# Patient Record
Sex: FEMALE | Race: WHITE | NOT HISPANIC OR LATINO | Employment: FULL TIME | URBAN - METROPOLITAN AREA
[De-identification: names, ages, dates, MRNs, and addresses within clinical notes are randomized per-mention and may not be internally consistent; named-entity substitution may affect disease eponyms.]

---

## 2017-06-09 ENCOUNTER — HOSPITAL ENCOUNTER (OUTPATIENT)
Dept: MAMMOGRAPHY | Facility: CLINIC | Age: 52
Discharge: HOME | End: 2017-06-09
Attending: FAMILY MEDICINE | Admitting: FAMILY MEDICINE
Payer: COMMERCIAL

## 2017-06-09 DIAGNOSIS — Z12.31 VISIT FOR SCREENING MAMMOGRAM: ICD-10-CM

## 2017-06-09 PROCEDURE — G0202 SCR MAMMO BI INCL CAD: HCPCS

## 2017-06-19 ENCOUNTER — OFFICE VISIT (OUTPATIENT)
Dept: FAMILY MEDICINE | Facility: CLINIC | Age: 52
End: 2017-06-19
Payer: COMMERCIAL

## 2017-06-19 VITALS
HEIGHT: 65 IN | HEART RATE: 66 BPM | OXYGEN SATURATION: 100 % | RESPIRATION RATE: 20 BRPM | WEIGHT: 168 LBS | BODY MASS INDEX: 27.99 KG/M2 | DIASTOLIC BLOOD PRESSURE: 70 MMHG | SYSTOLIC BLOOD PRESSURE: 122 MMHG | TEMPERATURE: 98.5 F

## 2017-06-19 DIAGNOSIS — R60.0 BILATERAL LEG EDEMA: ICD-10-CM

## 2017-06-19 DIAGNOSIS — Z00.01 ENCOUNTER FOR ROUTINE ADULT HEALTH EXAMINATION WITH ABNORMAL FINDINGS: Primary | ICD-10-CM

## 2017-06-19 DIAGNOSIS — K64.4 PROLAPSED EXTERNAL HEMORRHOIDS: ICD-10-CM

## 2017-06-19 PROCEDURE — 99396 PREV VISIT EST AGE 40-64: CPT | Performed by: FAMILY MEDICINE

## 2017-06-19 PROCEDURE — 99213 OFFICE O/P EST LOW 20 MIN: CPT | Mod: 25 | Performed by: FAMILY MEDICINE

## 2017-06-19 RX ORDER — LIDOCAINE 5 G/100G
CREAM RECTAL; TOPICAL
Qty: 15 G | Refills: 1 | Status: SHIPPED | OUTPATIENT
Start: 2017-06-19 | End: 2018-06-22

## 2017-06-19 ASSESSMENT — PAIN SCALES - GENERAL: PAINLEVEL: NO PAIN (0)

## 2017-06-19 NOTE — PROGRESS NOTES
SUBJECTIVE:     CC: Merari Cowart is an 51 year old woman who presents for preventive health visit.     Physical   Annual:     Getting at least 3 servings of Calcium per day::  NO    Bi-annual eye exam::  Yes    Dental care twice a year::  Yes    Sleep apnea or symptoms of sleep apnea::  None    Diet::  Regular (no restrictions)    Frequency of exercise::  4-5 days/week    Duration of exercise::  30-45 minutes    Taking medications regularly::  Yes    Medication side effects::  Not applicable    Additional concerns today::  YES    Merari is a 52 yo premenopausal female presenting for routine health maintenance exam with the following concerns-  - Hemorrhoids: patient has had hemorrhoids since pregnancy and delivery 24 years ago. Do not bother her all the time but intermittently flare and become painful. Has not noticed blood in her stool. Does not have regular bowel movements and sometimes strains to have BM. Drinks water throughout the day. Does not use a fiber supplement.   -Bilateral LE Edema: on physical exam noted patient has bilateral LE swelling. Patient reports she has had this for years when the weather is warmer. It is non-painful. Has tried wearing compression stockings but found them uncomfortable.   She otherwise is doing well. Last physical exam was 1 years ago and it was normal. Last pap with HPV co-testing in 5/2016 was normal and negative. Still having regular menstrual cycles. No history of STD and denies risks for it. No URI symptoms include running nose, nasal congestion, coughing, fever or chill. Denies problems with urination.  No abdominal pain. Exercises regularly with walks- no chest pain or SOB with walking. No leg cramping. No drug, alcohol, or tobacco use. Feels safe at home and at work. Lives with her  and their son. No other concerns today.    10 point ROS of systems including Constitutional, Eyes, Respiratory, Cardiovascular, Gastroenterology, Genitourinary,  Integumentary, Muscularskeletal, Psychiatric were all negative except for pertinent positives noted in my HPI.      Today's PHQ-2 Score:   PHQ-2 ( 1999 Pfizer) 6/14/2017   Q1: Little interest or pleasure in doing things 0   Q2: Feeling down, depressed or hopeless 0   PHQ-2 Score 0   Q1: Little interest or pleasure in doing things Not at all   Q2: Feeling down, depressed or hopeless Not at all   PHQ-2 Score 0       Abuse: Current or Past(Physical, Sexual or Emotional)- No  Do you feel safe in your environment - Yes    Social History   Substance Use Topics     Smoking status: Never Smoker     Smokeless tobacco: Never Used     Alcohol use 0.0 oz/week     0 Standard drinks or equivalent per week      Comment: 6/month     The patient does not drink >3 drinks per day nor >7 drinks per week.    Recent Labs   Lab Test  05/24/16   1049   CHOL  199   HDL  58   LDL  107*   TRIG  168*   NHDL  141*       Reviewed orders with patient.  Reviewed health maintenance and updated orders accordingly - Yes    Mammo Decision Support:6/2017  Pertinent mammograms are reviewed under the imaging tab.  History of abnormal Pap smear: NO - age 30-65 PAP every 5 years with negative HPV co-testing recommended    Reviewed and updated as needed this visit by clinical staff  Tobacco  Allergies  Meds  Problems  Med Hx  Surg Hx  Fam Hx  Soc Hx          Reviewed and updated as needed this visit by Provider  Tobacco  Allergies  Meds  Problems  Med Hx  Surg Hx  Fam Hx  Soc Hx             ROS:  C: NEGATIVE for fever, chills, change in weight  I: NEGATIVE for worrisome rashes, moles or lesions  E: NEGATIVE for vision changes or irritation  ENT: NEGATIVE for ear, mouth and throat problems  R: NEGATIVE for significant cough or SOB  B: NEGATIVE for masses, tenderness or discharge  CV: NEGATIVE for chest pain, palpitations or peripheral edema  GI: NEGATIVE for nausea, abdominal pain, heartburn, or change in bowel habits  : NEGATIVE for unusual  "urinary or vaginal symptoms. Periods are regular.  M: NEGATIVE for significant arthralgias or myalgia  N: NEGATIVE for weakness, dizziness or paresthesias  P: NEGATIVE for changes in mood or affect    Problem list, Medication list, Allergies, and Medical/Social/Surgical histories reviewed in [facility] and updated as appropriate.  OBJECTIVE:     /70  Pulse 66  Temp 98.5  F (36.9  C) (Temporal)  Resp 20  Ht 5' 5\" (1.651 m)  Wt 168 lb (76.2 kg)  SpO2 100%  BMI 27.96 kg/m2  EXAM:  GENERAL: healthy, alert and no distress  EYES: Eyes grossly normal to inspection, PERRL and conjunctivae and sclerae normal  HENT: ear canals and TM's normal, nose and mouth without ulcers or lesions  NECK: no adenopathy, no asymmetry, masses, or scars and thyroid normal to palpation  RESP: lungs clear to auscultation - no rales, rhonchi or wheezes  CV: regular rate and rhythm, normal S1 S2, no murmur, click or rub  ABDOMEN: soft, nontender, no hepatosplenomegaly, no masses and bowel sounds present  RECTAL: 1cm sized prolapsed hemorrhoids noted at 3 o'clock and 6 o'clock positions, non inflamed, non-thrombotic, with some tenderness to palpation.  No fissures noted.  MS: 1+ bilateral LE edema, non tender to palpation and no palpable abnormalities  SKIN: no suspicious lesions or rashes  NEURO: Normal strength and tone equal bilaterally, mentation intact and speech normal  PSYCH: mentation appears normal, affect normal/bright, makes appropriate eye contact    ASSESSMENT/PLAN:     Merari was seen today for routine health maintenance exam.      ICD-10-CM    1. Encounter for routine adult health examination with abnormal findings Z00.01    2. Prolapsed external hemorrhoids K64.4 hydrocortisone (ANUSOL-HC) 2.5 % cream     lidocaine, Anorectal, 5 % CREA   3. Bilateral leg edema R60.0      1. Encounter for routine adult health examination with abnormal findings- Overall Merari is healthy and doing well. UTD for immunizations.  Topics " appropriate for her age discussed include safety issue and healthy diet as well as substance abuse/STD/depression prevention. Recommended daily exercise for at least 30 minutes.  Emphasized on healthy and regular diet with adequate fluid intake and resting. Recommend considering a daily fiber supplement for more regular bowel movements. She stated that she feels safe at home- no concern for abuse.   - Follow up in 1 year for routine health maintenance exam. No labs ordered today as lipid screen and fasting glucose normal at last years visit and no risk factors or family history identified today.   - Hepatitis C screen not done today- patient has donated blood in past 5 years without issues. May consider testing in the future when routine labs are necessary.  - UTD for her colonoscopy - due in 4 years  - UTD for mammogram- recommend annual or every 2 years.     2. Prolapsed external hemorrhoids- Patient with prolapsed Grade 2-3 hemorrhoid on physical exam. Plan for referral to general surgery for further evaluation and potential intervention. Prescribed topical hydrocortisone and lidocaine to help with symptomatic flares until surgery consult.   -     hydrocortisone (ANUSOL-HC) 2.5 % cream; Place rectally 2 times daily  -     lidocaine, Anorectal, 5 % CREA; Apply to rectum twice daily.    3. Bilateral leg edema- Bilateral LE edema that is non painful and non-pitting. Most likely secondary to venous insufficiency. Continue to monitor and if swelling becomes more severe, or seems to start occurring year round will consider further evaluation at that time.         COUNSELING:  Reviewed preventive health counseling, as reflected in patient instructions  Special attention given to:        Regular exercise       Healthy diet/nutrition       Vision screening       Hearing screening       Immunizations up to date           Colon cancer screening       Consider Hep C screening for patients born between 1945 and 1965          "reports that she has never smoked. She has never used smokeless tobacco.    Estimated body mass index is 27.96 kg/(m^2) as calculated from the following:    Height as of this encounter: 5' 5\" (1.651 m).    Weight as of this encounter: 168 lb (76.2 kg).   Weight management plan: Discussed healthy diet and exercise guidelines and patient will follow up in as needed in clinic to re-evaluate.    Counseling Resources:  ATP IV Guidelines  Pooled Cohorts Equation Calculator  Breast Cancer Risk Calculator  FRAX Risk Assessment  ICSI Preventive Guidelines  Dietary Guidelines for Americans, 2010  USDA's MyPlate  ASA Prophylaxis  Lung CA Screening    Geronimo Greenberg MD  [facility]  Answers for HPI/ROS submitted by the patient on 6/14/2017   PHQ-2 Score: 0        I, Kasey Zambrano, am serving as a scribe; to document services personally performed by Geronimo Greenberg MD - based on data collection and the provider's statements to me.    Provider Disclosure:  I agree with above History, Review of Systems, Physical exam and Plan. I have reviewed the content of the documentation and have edited it as needed. I have personally performed the services documented here and the documentation accurately represents those services and the decisions I have made.    Geronimo Greenberg MD   "

## 2017-06-19 NOTE — MR AVS SNAPSHOT
After Visit Summary   6/19/2017    Merari Cowart    MRN: [id number]           Patient Information     Date Of Birth          1965        Visit Information        Provider Department      6/19/2017 8:00 AM Geronimo Greenberg MD [facility]        Today's Diagnoses     Encounter for routine adult health examination with abnormal findings    -  1    Prolapsed external hemorrhoids          Care Instructions      Preventive Health Recommendations  Female Ages 50 - 64    Yearly exam: See your health care provider every year in order to  o Review health changes.   o Discuss preventive care.    o Review your medicines if your doctor has prescribed any.      Get a Pap test every three years (unless you have an abnormal result and your provider advises testing more often).    If you get Pap tests with HPV test, you only need to test every 5 years, unless you have an abnormal result.     You do not need a Pap test if your uterus was removed (hysterectomy) and you have not had cancer.    You should be tested each year for STDs (sexually transmitted diseases) if you're at risk.     Have a mammogram every 1 to 2 years.    Have a colonoscopy at age 50, or have a yearly FIT test (stool test). These exams screen for colon cancer.      Have a cholesterol test every 5 years, or more often if advised.    Have a diabetes test (fasting glucose) every three years. If you are at risk for diabetes, you should have this test more often.     If you are at risk for osteoporosis (brittle bone disease), think about having a bone density scan (DEXA).    Shots: Get a flu shot each year. Get a tetanus shot every 10 years.    Nutrition:     Eat at least 5 servings of fruits and vegetables each day.    Eat whole-grain bread, whole-wheat pasta and brown rice instead of white grains and rice.    Talk to your provider about Calcium and Vitamin D.     Lifestyle    Exercise at least 150 minutes a week (30 minutes  a day, 5 days a week). This will help you control your weight and prevent disease.    Limit alcohol to one drink per day.    No smoking.     Wear sunscreen to prevent skin cancer.     See your dentist every six months for an exam and cleaning.    See your eye doctor every 1 to 2 years.            Follow-ups after your visit        Your next 10 appointments already scheduled     Jul 10, 2017  2:15 PM CDT   New Visit with Sky Cordova MD   [facility]    [address] [place] [id number] [phone]              Who to contact     If you have questions or need follow up information about today's clinic visit or your schedule please contact [facility] directly at [phone].  Normal or non-critical lab and imaging results will be communicated to you by MyChart, letter or phone within 4 business days after the clinic has received the results. If you do not hear from us within 7 days, please contact the clinic through [employer] or phone. If you have a critical or abnormal lab result, we will notify you by phone as soon as possible.  Submit refill requests through [employer] or call your pharmacy and they will forward the refill request to us. Please allow 3 business days for your refill to be completed.          Additional Information About Your Visit        [employer] Information     [employer] gives you secure access to your electronic health record. If you see a primary care provider, you can also send messages to your care team and make appointments. If you have questions, please call your primary care clinic.  If you do not have a primary care provider, please call [phone] and they will assist you.        Care EveryWhere ID     This is your Care EveryWhere ID. This could be used by other organizations to access your [facility] medical records  [id number]        Your Vitals Were     Pulse Temperature Respirations Height Pulse Oximetry BMI (Body  "Mass Index)    66 98.5  F (36.9  C) (Temporal) 20 5' 5\" (1.651 m) 100% 27.96 kg/m2       Blood Pressure from Last 3 Encounters:   06/19/17 122/70   09/28/16 126/81   09/06/16 116/74    Weight from Last 3 Encounters:   06/19/17 168 lb (76.2 kg)   10/06/16 175 lb 3.2 oz (79.5 kg)   09/28/16 179 lb 9.6 oz (81.5 kg)              Today, you had the following     No orders found for display         Today's Medication Changes          These changes are accurate as of: 6/19/17  9:32 AM.  If you have any questions, ask your nurse or doctor.               Start taking these medicines.        Dose/Directions    hydrocortisone 2.5 % cream   Commonly known as:  ANUSOL-HC   Used for:  Prolapsed external hemorrhoids   Started by:  Geronimo Greenberg MD        Place rectally 2 times daily   Quantity:  30 g   Refills:  1       lidocaine (Anorectal) 5 % Crea   Used for:  Prolapsed external hemorrhoids   Started by:  Geronimo Greenberg MD        Apply to rectum twice daily.   Quantity:  15 g   Refills:  1            Where to get your medicines      These medications were sent to [facility] [address] [place] [address] [address]  [address] [facility] [address]     Phone:  [phone]     hydrocortisone 2.5 % cream    lidocaine (Anorectal) 5 % Crea                Primary Care Provider Office Phone # Fax #    Geronimo Greenberg [phone]       [address] [address]        Thank you!     Thank you for choosing [facility]  for your care. Our goal is always to provide you with excellent care. Hearing back from our patients is one way we can continue to improve our services. Please take a few minutes to complete the written survey that you may receive in the mail after your visit with us. Thank you!             Your Updated Medication List - Protect others around you: Learn how to safely use, store and throw away your medicines at " www.disposemymeds.org.          This list is accurate as of: 6/19/17  9:32 AM.  Always use your most recent med list.                   Brand Name Dispense Instructions for use    CAYENNE PEPPER PO          hydrocortisone 2.5 % cream    ANUSOL-HC    30 g    Place rectally 2 times daily       lidocaine (Anorectal) 5 % Crea     15 g    Apply to rectum twice daily.       Multi-vitamin Tabs tablet      Take 1 tablet by mouth daily       PRO-BIOTIC BLEND PO

## 2017-06-19 NOTE — PROGRESS NOTES
"   SUBJECTIVE:     CC: Merari Cowart is an 51 year old woman who presents for preventive health visit.     Healthy Habits:    Do you get at least three servings of calcium containing foods daily (dairy, green leafy vegetables, etc.)? {YES/NO, DAIRY INTAKE:849378::\"yes\"}    Amount of exercise or daily activities, outside of work: {AMOUNT EXERCISE:638819}    Problems taking medications regularly {Yes /No default:714864::\"No\"}    Medication side effects: {Yes /No default.:989695::\"No\"}    Have you had an eye exam in the past two years? {YESNOBLANK:832812}    Do you see a dentist twice per year? {YESNOBLANK:084475}    Do you have sleep apnea, excessive snoring or daytime drowsiness?{YESNOBLANK:814545}    {Outside tests to abstract? :410672}    {additional problems to add:617052}    Today's PHQ-2 Score:   PHQ-2 ( 1999 Pfizer) 6/14/2017 9/6/2016   Q1: Little interest or pleasure in doing things 0 0   Q2: Feeling down, depressed or hopeless 0 0   PHQ-2 Score 0 0   Q1: Little interest or pleasure in doing things Not at all -   Q2: Feeling down, depressed or hopeless Not at all -   PHQ-2 Score 0 -     {PHQ-2 LOOK IN ASSESSMENTS :228388}  Abuse: Current or Past(Physical, Sexual or Emotional)- {YES/NO/NA:661292}  Do you feel safe in your environment - {YES/NO/NA:474397}    Social History   Substance Use Topics     Smoking status: Never Smoker     Smokeless tobacco: Never Used     Alcohol use 0.0 oz/week     0 Standard drinks or equivalent per week      Comment: 6/month     {ETOH AUDIT:018921}    Recent Labs   Lab Test  05/24/16   1049   CHOL  199   HDL  58   LDL  107*   TRIG  168*   NHDL  141*       Reviewed orders with patient.  Reviewed health maintenance and updated orders accordingly - {Yes/No:269579::\"Yes\"}    Mammo Decision Support:  {Mammo:807323}    Pertinent mammograms are reviewed under the imaging tab.  History of abnormal Pap smear: {PAP HX:453220}    Reviewed and updated as needed this visit by clinical " "staff         Reviewed and updated as needed this visit by Provider        {HISTORY OPTIONS:864901}    ROS:  {FEMALE PREVENTATIVE ROS:263782}    {CHRONICPROBDATA:748635}  OBJECTIVE:     There were no vitals taken for this visit.  EXAM:  {Exam Choices:108448}    ASSESSMENT/PLAN:     {Diag Picklist:653326}    COUNSELING:   {FEMALE COUNSELING MESSAGES:558049::\"Reviewed preventive health counseling, as reflected in patient instructions\"}    {Blood Pressure Screenin}     reports that she has never smoked. She has never used smokeless tobacco.  {Tobacco Cessation needed for ACO -- Delete if patient is a non-smoker:408406}  Estimated body mass index is 29.15 kg/(m^2) as calculated from the following:    Height as of 16: 5' 5\" (1.651 m).    Weight as of 10/6/16: 175 lb 3.2 oz (79.5 kg).   {Weight Management Plan needed for ACO:030044}    Counseling Resources:  ATP IV Guidelines  Pooled Cohorts Equation Calculator  Breast Cancer Risk Calculator  FRAX Risk Assessment  ICSI Preventive Guidelines  Dietary Guidelines for Americans,   USDA's MyPlate  ASA Prophylaxis  Lung CA Screening    Geronimo Greenberg MD  [facility]  "

## 2017-06-19 NOTE — PROGRESS NOTES
"SUBJECTIVE:  Merari is a 51 year old female who comes in today for discussion on hemorrhoids in addition to her preventative visit.  We discussed the additional charge that will apply to this visit and she understands this.      Patient has had hemorrhoids since pregnancy and delivery 24 years ago. Do not bother her all the time but intermittently flare and become painful. Has not noticed blood in her stool. Does not have regular bowel movements and sometimes strains to have BM. Drinks water throughout the day. Does not use a fiber supplement.     ROS:  See preventative note.  Pertinent review of systems assessed and negative except as stated above.     OBJECTIVE:  /70  Pulse 66  Temp 98.5  F (36.9  C) (Temporal)  Resp 20  Ht 5' 5\" (1.651 m)  Wt 168 lb (76.2 kg)  SpO2 100%  BMI 27.96 kg/m2  Body mass index is 27.96 kg/(m^2).  GENERAL APPEARANCE: healthy, alert and no distress  RECTAL: 1cm sized prolapsed hemorrhoids noted at 3 o'clock and 6 o'clock positions, non inflamed, non-thrombotic, with some tenderness to palpation.  No fissures noted.    ASSESSMENT/ORDERS:    ICD-10-CM    1. Prolapsed external hemorrhoids K64.4 hydrocortisone (ANUSOL-HC) 2.5 % cream     lidocaine, Anorectal, 5 % CREA     PLAN:  1.  Patient with prolapsed Grade 2-3 hemorrhoid on physical exam. Plan for referral to general surgery for further evaluation and potential surgical excision. Prescribed topical hydrocortisone and lidocaine to help with symptomatic flares until surgery consult.   -     hydrocortisone (ANUSOL-HC) 2.5 % cream; Place rectally 2 times daily  -     lidocaine, Anorectal, 5 % CREA; Apply to rectum twice daily.     Geronimo Greenberg MD    "

## 2017-07-10 ENCOUNTER — OFFICE VISIT (OUTPATIENT)
Dept: SURGERY | Facility: CLINIC | Age: 52
End: 2017-07-10
Payer: COMMERCIAL

## 2017-07-10 VITALS — WEIGHT: 165.4 LBS | HEART RATE: 80 BPM | OXYGEN SATURATION: 100 % | BODY MASS INDEX: 27.52 KG/M2 | TEMPERATURE: 98.4 F

## 2017-07-10 DIAGNOSIS — K64.4 RESIDUAL HEMORRHOIDAL SKIN TAG: Primary | ICD-10-CM

## 2017-07-10 PROCEDURE — 99243 OFF/OP CNSLTJ NEW/EST LOW 30: CPT | Mod: 25 | Performed by: SPECIALIST

## 2017-07-10 PROCEDURE — 46600 DIAGNOSTIC ANOSCOPY SPX: CPT | Performed by: SPECIALIST

## 2017-07-10 NOTE — PROGRESS NOTES
Consult requested by Dr. Greenberg    Reason for consultation - hemorrhoids      HPI:  Patient is a 51-year-old white female presenting with a 24 year history of hemorrhoids. She states they flareup when she has constipation which happens about once a month. She feels as long as she is sticks to a good diet her constipation is much improved. She has pain with flareups but denies any pain with bowel movements or bleeding. Last colonoscopy was in 2016 and she reports as normal. There is a family history of colon cancer in her father. She now presents for evaluation of her hemorrhoids.    No past medical history on file.    Past Surgical History:   Procedure Laterality Date     APPENDECTOMY  1992     COLONOSCOPY N/A 6/20/2016    Procedure: COLONOSCOPY;  Surgeon: Danny Caballero MD;  Location:  GI     EXCISE MASS BACK N/A 9/28/2016    Procedure: EXCISE MASS BACK;  Surgeon: Sky Cordova MD;  Location:  OR     Current Outpatient Prescriptions   Medication     hydrocortisone (ANUSOL-HC) 2.5 % cream     lidocaine, Anorectal, 5 % CREA     Probiotic Product (PRO-BIOTIC BLEND PO)     Capsicum, Cayenne, (CAYENNE PEPPER PO)     multivitamin, therapeutic with minerals (MULTI-VITAMIN) TABS     No current facility-administered medications for this visit.         Allergies   Allergen Reactions     No Known Drug Allergies        Social History   Substance Use Topics     Smoking status: Never Smoker     Smokeless tobacco: Never Used     Alcohol use 0.0 oz/week     0 Standard drinks or equivalent per week      Comment: 6/month     Family History   Problem Relation Age of Onset     Colon Cancer Father      Breast Cancer No family hx of      Coronary Artery Disease No family hx of      Hyperlipidemia No family hx of      DIABETES No family hx of       ROS: 10 point ROS neg other than the symptoms noted above in the HPI.    PE:  B/P: Data Unavailable, T: 98.4, P: 80, R: Data Unavailable  General: well developed, well nourished  WF who appears her stated age  HEENT: NC/AT, EOMI, (-)icterus, (-)injection  Neck: Supple, No JVD  Chest: CTA  Heart: S1, S2, (-)m/r/g  Abd: Soft, non tender, non distended  Rectal: No masses, residual tag at 12 o'clock lithotomy  Anoscopy: No masses or internal hemorrhoids  Ext; Warm, no edema  Psych: AAOx3  Neuro: No focal deficits    Colonoscopy - report reviewed from 2016    Impression/plan:  This is a 51-year-old lady presenting with residual hemorrhoidal skin tag. The patient like to have it excised. The plan at this time is for an excision under LMA. The procedure, risks, benefits and alternatives were discussed and she agrees to proceed. She'll be scheduled in the near future.    Sky Cordova MD, FACS

## 2017-07-10 NOTE — MR AVS SNAPSHOT
After Visit Summary   7/10/2017    Merari Cowart    MRN: [id number]           Patient Information     Date Of Birth          1965        Visit Information        Provider Department      7/10/2017 2:15 PM Sky Cordova MD [facility]        Today's Diagnoses     Residual hemorrhoidal skin tag    -  1       Follow-ups after your visit        Who to contact     If you have questions or need follow up information about today's clinic visit or your schedule please contact [facility] directly at [phone].  Normal or non-critical lab and imaging results will be communicated to you by Quitt.chhart, letter or phone within 4 business days after the clinic has received the results. If you do not hear from us within 7 days, please contact the clinic through Photeticat or phone. If you have a critical or abnormal lab result, we will notify you by phone as soon as possible.  Submit refill requests through Good Technology or call your pharmacy and they will forward the refill request to us. Please allow 3 business days for your refill to be completed.          Additional Information About Your Visit        MyChart Information     Good Technology gives you secure access to your electronic health record. If you see a primary care provider, you can also send messages to your care team and make appointments. If you have questions, please call your primary care clinic.  If you do not have a primary care provider, please call [phone] and they will assist you.        Care EveryWhere ID     This is your Care EveryWhere ID. This could be used by other organizations to access your [place] medical records  [id number]        Your Vitals Were     Pulse Temperature Pulse Oximetry BMI (Body Mass Index)          80 98.4  F (36.9  C) (Temporal) 100% 27.52 kg/m2         Blood Pressure from Last 3 Encounters:   06/19/17 122/70   09/28/16 126/81   09/06/16 116/74    Weight from Last 3 Encounters:    07/10/17 165 lb 6.4 oz (75 kg)   06/19/17 168 lb (76.2 kg)   10/06/16 175 lb 3.2 oz (79.5 kg)              We Performed the Following     ANOSCOPY W/WO BRUSH/WASH        Primary Care Provider Office Phone # Fax #    Geronimo Greenberg [phone]       [address]   [facility] [place] [address]        Equal Access to Services     LUISA PERSON : Hadii aad ku hadasho Soomaali, waaxda luqadaha, qaybta kaalmada adeegyada, waxay idiin hayaan adeeg kharash la'aan . So [facility] [phone].    ATENCIÓN: Si habla español, tiene a gonzalez disposición servicios gratuitos de asistencia lingüística. [facility] [phone].    We comply with applicable federal civil rights laws and [place] laws. We do not discriminate on the basis of race, color, national origin, age, disability sex, sexual orientation or gender identity.            Thank you!     Thank you for choosing [facility]  for your care. Our goal is always to provide you with excellent care. Hearing back from our patients is one way we can continue to improve our services. Please take a few minutes to complete the written survey that you may receive in the mail after your visit with us. Thank you!             Your Updated Medication List - Protect others around you: Learn how to safely use, store and throw away your medicines at www.disposemymeds.org.          This list is accurate as of: 7/10/17  2:50 PM.  Always use your most recent med list.                   Brand Name Dispense Instructions for use Diagnosis    CAYENNE PEPPER PO           hydrocortisone 2.5 % cream    ANUSOL-HC    30 g    Place rectally 2 times daily    Prolapsed external hemorrhoids       lidocaine (Anorectal) 5 % Crea     15 g    Apply to rectum twice daily.    Prolapsed external hemorrhoids       Multi-vitamin Tabs tablet      Take 1 tablet by mouth daily        PRO-BIOTIC BLEND PO

## 2017-07-10 NOTE — NURSING NOTE
[facility]    Merari Cowart has been given the following teaching information:  Before Your Surgery booklet  Instructions for Showering or Bathing before Surgery  Request for surgery faxed to Meaghan at [facility]

## 2017-07-10 NOTE — NURSING NOTE
"Chief Complaint   Patient presents with     Rectal Problem     rectal tags, hemorrhoids     Consult     referring Dionicio       Initial Pulse 80  Temp 98.4  F (36.9  C) (Temporal)  Wt 165 lb 6.4 oz (75 kg)  SpO2 100%  BMI 27.52 kg/m2 Estimated body mass index is 27.52 kg/(m^2) as calculated from the following:    Height as of 6/19/17: 5' 5\" (1.651 m).    Weight as of this encounter: 165 lb 6.4 oz (75 kg).  Medication Reconciliation: complete    "

## 2017-07-13 ENCOUNTER — TELEPHONE (OUTPATIENT)
Dept: SURGERY | Facility: OTHER | Age: 52
End: 2017-07-13

## 2017-07-13 NOTE — TELEPHONE ENCOUNTER
Surgery Scheduled    Date of Surgery 09/15/17 Time of Surgery 1:00pm  Procedure: Excision Hemorrhoidal Skin Tags  Hospital/Surgical Facility: [place]  Surgeon: Dr Cordova  Type of Anesthesia Anticipated: LMA  Pre-Op: 08/18/17 with Dr Greenberg   Post-Op: 09/25/17 with Dr Cordova  Pre-Certification -to be completed  Consent Signed -to be completed  Hospital Stay -same day procedure    Surgery Packet (and/or) Colonscopy Prep (was given/or mailed) to patient. Patient was also instructed to arrive 1 hour(s) prior to surgery.  Patient understood and agrees to the plan.      Meaghan Alonso  Specialty

## 2017-08-18 ENCOUNTER — OFFICE VISIT (OUTPATIENT)
Dept: FAMILY MEDICINE | Facility: CLINIC | Age: 52
End: 2017-08-18
Payer: COMMERCIAL

## 2017-08-18 VITALS
SYSTOLIC BLOOD PRESSURE: 108 MMHG | HEART RATE: 74 BPM | RESPIRATION RATE: 18 BRPM | HEIGHT: 65 IN | WEIGHT: 163 LBS | OXYGEN SATURATION: 100 % | BODY MASS INDEX: 27.16 KG/M2 | TEMPERATURE: 98.4 F | DIASTOLIC BLOOD PRESSURE: 60 MMHG

## 2017-08-18 DIAGNOSIS — K64.4 RESIDUAL HEMORRHOIDAL SKIN TAGS: ICD-10-CM

## 2017-08-18 DIAGNOSIS — Z01.818 PREOP GENERAL PHYSICAL EXAM: Primary | ICD-10-CM

## 2017-08-18 PROCEDURE — 99214 OFFICE O/P EST MOD 30 MIN: CPT | Performed by: FAMILY MEDICINE

## 2017-08-18 ASSESSMENT — PAIN SCALES - GENERAL: PAINLEVEL: NO PAIN (0)

## 2017-08-18 NOTE — PROGRESS NOTES
[address] [address]  [phone]  Dept: [phone]    PRE-OP EVALUATION:  Today's date: 2017    Merari Cowart (: 1965) presents for pre-operative evaluation assessment as requested by Dr. MILES.  She requires evaluation and anesthesia risk assessment prior to undergoing surgery/procedure for treatment of hemorrhoids .  Proposed procedure: Hemorrhoids    Date of Surgery/ Procedure: 9/15  Time of Surgery/ Procedure:   Hospital/Surgical Facility: [facility]  Fax number for surgical facility:   Primary Physician: Geronimo Greenberg  Type of Anesthesia Anticipated: General    Patient has a Health Care Directive or Living Will:  NO    1. NO - Do you have a history of heart attack, stroke, stent, bypass or surgery on an artery in the head, neck, heart or legs?  2. NO - Do you ever have any pain or discomfort in your chest?  3. NO - Do you have a history of  Heart Failure?  4. NO - Are you troubled by shortness of breath when: walking on the level, up a slight hill or at night?  5. NO - Do you currently have a cold, bronchitis or other respiratory infection?  6. NO - Do you have a cough, shortness of breath or wheezing?  7. NO - Do you sometimes get pains in the calves of your legs when you walk?  8. NO - Do you or anyone in your family have previous history of blood clots?  9. NO - Do you or does anyone in your family have a serious bleeding problem such as prolonged bleeding following surgeries or cuts?  10. NO - Have you ever had problems with anemia or been told to take iron pills?  11. NO - Have you had any abnormal blood loss such as black, tarry or bloody stools, or abnormal vaginal bleeding?  12. NO - Have you ever had a blood transfusion?  13. NO - Have you or any of your relatives ever had problems with anesthesia?  14. NO - Do you have sleep apnea, excessive snoring or daytime drowsiness?  15. NO - Do you have any prosthetic heart  valves?  16. NO - Do you have prosthetic joints?  17. NO - Is there any chance that you may be pregnant?        HPI:                                                      Brief HPI related to upcoming procedure: history of chronic hemorrhoidal disease that has led to anal skin tag.   Has been recommended by Dr. Cordova for excision of this tissue.      See problem list for active medical problems.  Problems all longstanding and stable, except as noted/documented.  See ROS for pertinent symptoms related to these conditions.                                                                                                  .    MEDICAL HISTORY:                                                    Patient Active Problem List    Diagnosis Date Noted     CARDIOVASCULAR SCREENING; LDL GOAL LESS THAN 130 04/14/2011     Priority: Medium      No past medical history on file.  Past Surgical History:   Procedure Laterality Date     APPENDECTOMY  1992     COLONOSCOPY N/A 6/20/2016    Procedure: COLONOSCOPY;  Surgeon: Danny Caballero MD;  Location:  GI     EXCISE MASS BACK N/A 9/28/2016    Procedure: EXCISE MASS BACK;  Surgeon: Sky Cordova MD;  Location:  OR     Current Outpatient Prescriptions   Medication Sig Dispense Refill     hydrocortisone (ANUSOL-HC) 2.5 % cream Place rectally 2 times daily 30 g 1     lidocaine, Anorectal, 5 % CREA Apply to rectum twice daily. 15 g 1     Probiotic Product (PRO-BIOTIC BLEND PO)        Capsicum, Cayenne, (CAYENNE PEPPER PO)        multivitamin, therapeutic with minerals (MULTI-VITAMIN) TABS Take 1 tablet by mouth daily       OTC products: None, except as noted above    Allergies   Allergen Reactions     No Known Drug Allergies       Latex Allergy: NO    Social History   Substance Use Topics     Smoking status: Never Smoker     Smokeless tobacco: Never Used     Alcohol use 0.0 oz/week     0 Standard drinks or equivalent per week      Comment: 6/month     History   Drug Use No  "      REVIEW OF SYSTEMS:                                                    Constitutional, neuro, ENT, endocrine, pulmonary, cardiac, gastrointestinal, genitourinary, musculoskeletal, integument and psychiatric systems are negative, except as otherwise noted.      EXAM:                                                    /60  Pulse 74  Temp 98.4  F (36.9  C) (Temporal)  Resp 18  Ht 5' 5\" (1.651 m)  Wt 163 lb (73.9 kg)  LMP 07/22/2017  SpO2 100%  BMI 27.12 kg/m2    GENERAL APPEARANCE: healthy, alert and no distress     EYES: EOMI, PERRL     HENT: ear canals and TM's normal and nose and mouth without ulcers or lesions     NECK: no adenopathy, no asymmetry, masses, or scars and thyroid normal to palpation     RESP: lungs clear to auscultation - no rales, rhonchi or wheezes     CV: regular rates and rhythm, normal S1 S2, no S3 or S4 and no murmur, click or rub     ABDOMEN:  soft, nontender, no HSM or masses and bowel sounds normal     MS: extremities normal- no gross deformities noted, no evidence of inflammation in joints, FROM in all extremities.     SKIN: no suspicious lesions or rashes     NEURO: Normal strength and tone, sensory exam grossly normal, mentation intact and speech normal     PSYCH: mentation appears normal. and affect normal/bright     LYMPHATICS: normal ant/post cervical, supraclavicular nodes    DIAGNOSTICS:                                                    EKG: Not indicated due to non-vascular surgery and low risk of event (age <65 and without cardiac risk factors)    No results for input(s): HGB, PLT, INR, NA, POTASSIUM, CR, A1C in the last 51409 hours.     IMPRESSION:                                                    Reason for surgery/procedure:    Preop general physical exam  Residual hemorrhoidal skin tags      Diagnosis/reason for consult: general preoperative clearance.    The proposed surgical procedure is considered LOW risk.    REVISED CARDIAC RISK INDEX  The patient has " the following serious cardiovascular risks for perioperative complications such as (MI, PE, VFib and 3  AV Block):  No serious cardiac risks  INTERPRETATION: 0 risks: Class I (very low risk - 0.4% complication rate)    The patient has the following additional risks for perioperative complications:  No identified additional risks      ICD-10-CM    1. Preop general physical exam Z01.818    2. Residual hemorrhoidal skin tags K64.4        RECOMMENDATIONS:                                                      --Patient is to on no scheduled medications.    APPROVAL GIVEN to proceed with proposed procedure, without further diagnostic evaluation       Signed Electronically by: Geronimo Greenberg MD    Copy of this evaluation report is provided to requesting physician.    [place] Preop Guidelines

## 2017-08-18 NOTE — NURSING NOTE
"Estimated body mass index is 27.12 kg/(m^2) as calculated from the following:    Height as of this encounter: 5' 5\" (1.651 m).    Weight as of this encounter: 163 lb (73.9 kg).  BP Readings from Last 1 Encounters:   08/18/17 108/60   ]  BP cuff size:  regular  Do you feel safe in your environment?  Yes  Does the patient need any medication refills today? no    "

## 2017-08-18 NOTE — MR AVS SNAPSHOT
After Visit Summary   8/18/2017    Merari Cowart    MRN: [id number]           Patient Information     Date Of Birth          1965        Visit Information        Provider Department      8/18/2017 7:30 AM Geronimo Greenberg MD [facility]        Today's Diagnoses     Preop general physical exam    -  1    Residual hemorrhoidal skin tags          Care Instructions      Before Your Surgery      Call your surgeon if there is any change in your health. This includes signs of a cold or flu (such as a sore throat, runny nose, cough, rash or fever).    Do not smoke, drink alcohol or take over the counter medicine (unless your surgeon or primary care doctor tells you to) for the 24 hours before and after surgery.    If you take prescribed drugs: Follow your doctor s orders about which medicines to take and which to stop until after surgery.    Eating and drinking prior to surgery: follow the instructions from your surgeon    Take a shower or bath the night before surgery. Use the soap your surgeon gave you to gently clean your skin. If you do not have soap from your surgeon, use your regular soap. Do not shave or scrub the surgery site.  Wear clean pajamas and have clean sheets on your bed.           Follow-ups after your visit        Your next 10 appointments already scheduled     Sep 15, 2017   Procedure with Sky Cordova MD   [facility] ([facility])    [address] [place] [address]  [place] [id number] [phone]           From [address]: Exit at [address] on [address] side of [place]. Turn right on [address]. Turn left at stoplight on [address]. [facility] will be in view two blocks ahead            Sep 25, 2017  1:30 PM CDT   Return Visit with Sky Cordova MD   [facility]    [address] [id number] [phone]              Who to contact     If  "you have questions or need follow up information about today's clinic visit or your schedule please contact [facility] directly at [phone].  Normal or non-critical lab and imaging results will be communicated to you by MyChart, letter or phone within 4 business days after the clinic has received the results. If you do not hear from us within 7 days, please contact the clinic through SilkRoad Japanhart or phone. If you have a critical or abnormal lab result, we will notify you by phone as soon as possible.  Submit refill requests through Shopcaster or call your pharmacy and they will forward the refill request to us. Please allow 3 business days for your refill to be completed.          Additional Information About Your Visit        SilkRoad JapanharMango Information     Shopcaster gives you secure access to your electronic health record. If you see a primary care provider, you can also send messages to your care team and make appointments. If you have questions, please call your primary care clinic.  If you do not have a primary care provider, please call [phone] and they will assist you.        Care EveryWhere ID     This is your Care EveryWhere ID. This could be used by other organizations to access your [facility] medical records  [id number]        Your Vitals Were     Pulse Temperature Respirations Height Last Period Pulse Oximetry    74 98.4  F (36.9  C) (Temporal) 18 5' 5\" (1.651 m) 07/22/2017 100%    BMI (Body Mass Index)                   27.12 kg/m2            Blood Pressure from Last 3 Encounters:   08/18/17 108/60   06/19/17 122/70   09/28/16 126/81    Weight from Last 3 Encounters:   08/18/17 163 lb (73.9 kg)   07/10/17 165 lb 6.4 oz (75 kg)   06/19/17 168 lb (76.2 kg)              Today, you had the following     No orders found for display       Primary Care Provider Office Phone # Fax #    Geronimo Greenberg [phone]       [address] [place] [address]        Equal Access to " Services     [facility]: Hadii aad ku hadelisejass Zuleykacaty, wavicenteda luqadaha, qaybta kaalmayessica rosas, irving ross. So [facility] [phone].    ATENCIÓN: Si habla español, tiene a gonzalez disposición servicios gratuitos de asistencia lingüística. Llame al [phone].    We comply with applicable federal civil rights laws and [place] laws. We do not discriminate on the basis of race, color, national origin, age, disability sex, sexual orientation or gender identity.            Thank you!     Thank you for choosing [facility]  for your care. Our goal is always to provide you with excellent care. Hearing back from our patients is one way we can continue to improve our services. Please take a few minutes to complete the written survey that you may receive in the mail after your visit with us. Thank you!             Your Updated Medication List - Protect others around you: Learn how to safely use, store and throw away your medicines at www.disposemymeds.org.          This list is accurate as of: 8/18/17  4:29 PM.  Always use your most recent med list.                   Brand Name Dispense Instructions for use Diagnosis    CAYENNE PEPPER PO           hydrocortisone 2.5 % cream    ANUSOL-HC    30 g    Place rectally 2 times daily    Prolapsed external hemorrhoids       lidocaine (Anorectal) 5 % Crea     15 g    Apply to rectum twice daily.    Prolapsed external hemorrhoids       Multi-vitamin Tabs tablet      Take 1 tablet by mouth daily        PRO-BIOTIC BLEND PO

## 2017-09-14 ENCOUNTER — ANESTHESIA EVENT (OUTPATIENT)
Dept: SURGERY | Facility: CLINIC | Age: 52
End: 2017-09-14
Payer: COMMERCIAL

## 2017-09-14 NOTE — H&P (VIEW-ONLY)
[address] [address]  [phone]  Dept: [phone]    PRE-OP EVALUATION:  Today's date: 2017    Merari Cowart (: 1965) presents for pre-operative evaluation assessment as requested by Dr. MILES.  She requires evaluation and anesthesia risk assessment prior to undergoing surgery/procedure for treatment of hemorrhoids .  Proposed procedure: Hemorrhoids    Date of Surgery/ Procedure: 9/15  Time of Surgery/ Procedure:   Hospital/Surgical Facility: [facility]  Fax number for surgical facility:   Primary Physician: Geronimo Greenberg  Type of Anesthesia Anticipated: General    Patient has a Health Care Directive or Living Will:  NO    1. NO - Do you have a history of heart attack, stroke, stent, bypass or surgery on an artery in the head, neck, heart or legs?  2. NO - Do you ever have any pain or discomfort in your chest?  3. NO - Do you have a history of  Heart Failure?  4. NO - Are you troubled by shortness of breath when: walking on the level, up a slight hill or at night?  5. NO - Do you currently have a cold, bronchitis or other respiratory infection?  6. NO - Do you have a cough, shortness of breath or wheezing?  7. NO - Do you sometimes get pains in the calves of your legs when you walk?  8. NO - Do you or anyone in your family have previous history of blood clots?  9. NO - Do you or does anyone in your family have a serious bleeding problem such as prolonged bleeding following surgeries or cuts?  10. NO - Have you ever had problems with anemia or been told to take iron pills?  11. NO - Have you had any abnormal blood loss such as black, tarry or bloody stools, or abnormal vaginal bleeding?  12. NO - Have you ever had a blood transfusion?  13. NO - Have you or any of your relatives ever had problems with anesthesia?  14. NO - Do you have sleep apnea, excessive snoring or daytime drowsiness?  15. NO - Do you have any prosthetic heart  valves?  16. NO - Do you have prosthetic joints?  17. NO - Is there any chance that you may be pregnant?        HPI:                                                      Brief HPI related to upcoming procedure: history of chronic hemorrhoidal disease that has led to anal skin tag.   Has been recommended by Dr. Cordova for excision of this tissue.      See problem list for active medical problems.  Problems all longstanding and stable, except as noted/documented.  See ROS for pertinent symptoms related to these conditions.                                                                                                  .    MEDICAL HISTORY:                                                    Patient Active Problem List    Diagnosis Date Noted     CARDIOVASCULAR SCREENING; LDL GOAL LESS THAN 130 04/14/2011     Priority: Medium      No past medical history on file.  Past Surgical History:   Procedure Laterality Date     APPENDECTOMY  1992     COLONOSCOPY N/A 6/20/2016    Procedure: COLONOSCOPY;  Surgeon: Danny Caballero MD;  Location:  GI     EXCISE MASS BACK N/A 9/28/2016    Procedure: EXCISE MASS BACK;  Surgeon: Sky Cordova MD;  Location:  OR     Current Outpatient Prescriptions   Medication Sig Dispense Refill     hydrocortisone (ANUSOL-HC) 2.5 % cream Place rectally 2 times daily 30 g 1     lidocaine, Anorectal, 5 % CREA Apply to rectum twice daily. 15 g 1     Probiotic Product (PRO-BIOTIC BLEND PO)        Capsicum, Cayenne, (CAYENNE PEPPER PO)        multivitamin, therapeutic with minerals (MULTI-VITAMIN) TABS Take 1 tablet by mouth daily       OTC products: None, except as noted above    Allergies   Allergen Reactions     No Known Drug Allergies       Latex Allergy: NO    Social History   Substance Use Topics     Smoking status: Never Smoker     Smokeless tobacco: Never Used     Alcohol use 0.0 oz/week     0 Standard drinks or equivalent per week      Comment: 6/month     History   Drug Use No  "      REVIEW OF SYSTEMS:                                                    Constitutional, neuro, ENT, endocrine, pulmonary, cardiac, gastrointestinal, genitourinary, musculoskeletal, integument and psychiatric systems are negative, except as otherwise noted.      EXAM:                                                    /60  Pulse 74  Temp 98.4  F (36.9  C) (Temporal)  Resp 18  Ht 5' 5\" (1.651 m)  Wt 163 lb (73.9 kg)  LMP 07/22/2017  SpO2 100%  BMI 27.12 kg/m2    GENERAL APPEARANCE: healthy, alert and no distress     EYES: EOMI, PERRL     HENT: ear canals and TM's normal and nose and mouth without ulcers or lesions     NECK: no adenopathy, no asymmetry, masses, or scars and thyroid normal to palpation     RESP: lungs clear to auscultation - no rales, rhonchi or wheezes     CV: regular rates and rhythm, normal S1 S2, no S3 or S4 and no murmur, click or rub     ABDOMEN:  soft, nontender, no HSM or masses and bowel sounds normal     MS: extremities normal- no gross deformities noted, no evidence of inflammation in joints, FROM in all extremities.     SKIN: no suspicious lesions or rashes     NEURO: Normal strength and tone, sensory exam grossly normal, mentation intact and speech normal     PSYCH: mentation appears normal. and affect normal/bright     LYMPHATICS: normal ant/post cervical, supraclavicular nodes    DIAGNOSTICS:                                                    EKG: Not indicated due to non-vascular surgery and low risk of event (age <65 and without cardiac risk factors)    No results for input(s): HGB, PLT, INR, NA, POTASSIUM, CR, A1C in the last 38267 hours.     IMPRESSION:                                                    Reason for surgery/procedure:    Preop general physical exam  Residual hemorrhoidal skin tags      Diagnosis/reason for consult: general preoperative clearance.    The proposed surgical procedure is considered LOW risk.    REVISED CARDIAC RISK INDEX  The patient has " the following serious cardiovascular risks for perioperative complications such as (MI, PE, VFib and 3  AV Block):  No serious cardiac risks  INTERPRETATION: 0 risks: Class I (very low risk - 0.4% complication rate)    The patient has the following additional risks for perioperative complications:  No identified additional risks      ICD-10-CM    1. Preop general physical exam Z01.818    2. Residual hemorrhoidal skin tags K64.4        RECOMMENDATIONS:                                                      --Patient is to on no scheduled medications.    APPROVAL GIVEN to proceed with proposed procedure, without further diagnostic evaluation       Signed Electronically by: Geronimo Greenberg MD    Copy of this evaluation report is provided to requesting physician.    [facility] Preop Guidelines

## 2017-09-15 ENCOUNTER — HOSPITAL ENCOUNTER (OUTPATIENT)
Facility: CLINIC | Age: 52
Discharge: HOME | End: 2017-09-15
Attending: SPECIALIST | Admitting: SPECIALIST
Payer: COMMERCIAL

## 2017-09-15 ENCOUNTER — ANESTHESIA (OUTPATIENT)
Dept: SURGERY | Facility: CLINIC | Age: 52
End: 2017-09-15
Payer: COMMERCIAL

## 2017-09-15 VITALS
SYSTOLIC BLOOD PRESSURE: 121 MMHG | TEMPERATURE: 98 F | OXYGEN SATURATION: 100 % | HEART RATE: 58 BPM | RESPIRATION RATE: 12 BRPM | DIASTOLIC BLOOD PRESSURE: 76 MMHG

## 2017-09-15 DIAGNOSIS — G89.18 POST-OP PAIN: Primary | ICD-10-CM

## 2017-09-15 LAB — HCG UR QL: NEGATIVE

## 2017-09-15 PROCEDURE — 27210794 ZZH OR GENERAL SUPPLY STERILE: Performed by: SPECIALIST

## 2017-09-15 PROCEDURE — 71000027 ZZH RECOVERY PHASE 2 EACH 15 MINS: Performed by: SPECIALIST

## 2017-09-15 PROCEDURE — 25000128 H RX IP 250 OP 636: Performed by: NURSE ANESTHETIST, CERTIFIED REGISTERED

## 2017-09-15 PROCEDURE — 25000125 ZZHC RX 250: Performed by: NURSE ANESTHETIST, CERTIFIED REGISTERED

## 2017-09-15 PROCEDURE — 37000008 ZZH ANESTHESIA TECHNICAL FEE, 1ST 30 MIN: Performed by: SPECIALIST

## 2017-09-15 PROCEDURE — 71000014 ZZH RECOVERY PHASE 1 LEVEL 2 FIRST HR: Performed by: SPECIALIST

## 2017-09-15 PROCEDURE — 88304 TISSUE EXAM BY PATHOLOGIST: CPT | Mod: 26 | Performed by: SPECIALIST

## 2017-09-15 PROCEDURE — 36000050 ZZH SURGERY LEVEL 2 1ST 30 MIN: Performed by: SPECIALIST

## 2017-09-15 PROCEDURE — 46220 EXCISE ANAL EXT TAG/PAPILLA: CPT | Performed by: SPECIALIST

## 2017-09-15 PROCEDURE — 25000125 ZZHC RX 250: Performed by: SPECIALIST

## 2017-09-15 PROCEDURE — 25000132 ZZH RX MED GY IP 250 OP 250 PS 637: Performed by: SPECIALIST

## 2017-09-15 PROCEDURE — 37000009 ZZH ANESTHESIA TECHNICAL FEE, EACH ADDTL 15 MIN: Performed by: SPECIALIST

## 2017-09-15 PROCEDURE — 25000128 H RX IP 250 OP 636: Performed by: SPECIALIST

## 2017-09-15 PROCEDURE — 88304 TISSUE EXAM BY PATHOLOGIST: CPT | Performed by: SPECIALIST

## 2017-09-15 PROCEDURE — 40000306 ZZH STATISTIC PRE PROC ASSESS II: Performed by: SPECIALIST

## 2017-09-15 PROCEDURE — 81025 URINE PREGNANCY TEST: CPT | Performed by: NURSE ANESTHETIST, CERTIFIED REGISTERED

## 2017-09-15 RX ORDER — FENTANYL CITRATE 50 UG/ML
25-50 INJECTION, SOLUTION INTRAMUSCULAR; INTRAVENOUS
Status: DISCONTINUED | OUTPATIENT
Start: 2017-09-15 | End: 2017-09-15 | Stop reason: HOSPADM

## 2017-09-15 RX ORDER — DEXAMETHASONE SODIUM PHOSPHATE 10 MG/ML
INJECTION, SOLUTION INTRAMUSCULAR; INTRAVENOUS PRN
Status: DISCONTINUED | OUTPATIENT
Start: 2017-09-15 | End: 2017-09-15

## 2017-09-15 RX ORDER — SODIUM CHLORIDE, SODIUM LACTATE, POTASSIUM CHLORIDE, CALCIUM CHLORIDE 600; 310; 30; 20 MG/100ML; MG/100ML; MG/100ML; MG/100ML
INJECTION, SOLUTION INTRAVENOUS CONTINUOUS
Status: DISCONTINUED | OUTPATIENT
Start: 2017-09-15 | End: 2017-09-15 | Stop reason: HOSPADM

## 2017-09-15 RX ORDER — LIDOCAINE HYDROCHLORIDE 20 MG/ML
INJECTION, SOLUTION INFILTRATION; PERINEURAL PRN
Status: DISCONTINUED | OUTPATIENT
Start: 2017-09-15 | End: 2017-09-15

## 2017-09-15 RX ORDER — OXYCODONE HYDROCHLORIDE 5 MG/1
5-10 TABLET ORAL
Status: COMPLETED | OUTPATIENT
Start: 2017-09-15 | End: 2017-09-15

## 2017-09-15 RX ORDER — PROPOFOL 10 MG/ML
INJECTION, EMULSION INTRAVENOUS PRN
Status: DISCONTINUED | OUTPATIENT
Start: 2017-09-15 | End: 2017-09-15

## 2017-09-15 RX ORDER — LIDOCAINE 40 MG/G
CREAM TOPICAL
Status: DISCONTINUED | OUTPATIENT
Start: 2017-09-15 | End: 2017-09-15 | Stop reason: HOSPADM

## 2017-09-15 RX ORDER — CEFAZOLIN SODIUM 1 G/3ML
1 INJECTION, POWDER, FOR SOLUTION INTRAMUSCULAR; INTRAVENOUS SEE ADMIN INSTRUCTIONS
Status: DISCONTINUED | OUTPATIENT
Start: 2017-09-15 | End: 2017-09-15 | Stop reason: HOSPADM

## 2017-09-15 RX ORDER — BUPIVACAINE HYDROCHLORIDE AND EPINEPHRINE 5; 5 MG/ML; UG/ML
INJECTION, SOLUTION PERINEURAL PRN
Status: DISCONTINUED | OUTPATIENT
Start: 2017-09-15 | End: 2017-09-15 | Stop reason: HOSPADM

## 2017-09-15 RX ORDER — HYDROMORPHONE HYDROCHLORIDE 1 MG/ML
.3-.5 INJECTION, SOLUTION INTRAMUSCULAR; INTRAVENOUS; SUBCUTANEOUS EVERY 10 MIN PRN
Status: DISCONTINUED | OUTPATIENT
Start: 2017-09-15 | End: 2017-09-15 | Stop reason: HOSPADM

## 2017-09-15 RX ORDER — NALOXONE HYDROCHLORIDE 0.4 MG/ML
.1-.4 INJECTION, SOLUTION INTRAMUSCULAR; INTRAVENOUS; SUBCUTANEOUS
Status: DISCONTINUED | OUTPATIENT
Start: 2017-09-15 | End: 2017-09-15 | Stop reason: HOSPADM

## 2017-09-15 RX ORDER — FENTANYL CITRATE 50 UG/ML
INJECTION, SOLUTION INTRAMUSCULAR; INTRAVENOUS PRN
Status: DISCONTINUED | OUTPATIENT
Start: 2017-09-15 | End: 2017-09-15

## 2017-09-15 RX ORDER — ONDANSETRON 2 MG/ML
4 INJECTION INTRAMUSCULAR; INTRAVENOUS EVERY 30 MIN PRN
Status: DISCONTINUED | OUTPATIENT
Start: 2017-09-15 | End: 2017-09-15 | Stop reason: HOSPADM

## 2017-09-15 RX ORDER — ONDANSETRON 2 MG/ML
INJECTION INTRAMUSCULAR; INTRAVENOUS PRN
Status: DISCONTINUED | OUTPATIENT
Start: 2017-09-15 | End: 2017-09-15

## 2017-09-15 RX ORDER — ONDANSETRON 4 MG/1
4 TABLET, ORALLY DISINTEGRATING ORAL EVERY 30 MIN PRN
Status: DISCONTINUED | OUTPATIENT
Start: 2017-09-15 | End: 2017-09-15 | Stop reason: HOSPADM

## 2017-09-15 RX ORDER — DIMENHYDRINATE 50 MG/ML
25 INJECTION, SOLUTION INTRAMUSCULAR; INTRAVENOUS
Status: DISCONTINUED | OUTPATIENT
Start: 2017-09-15 | End: 2017-09-15 | Stop reason: HOSPADM

## 2017-09-15 RX ORDER — OXYCODONE HYDROCHLORIDE 5 MG/1
5-10 TABLET ORAL
Qty: 30 TABLET | Refills: 0 | Status: SHIPPED | OUTPATIENT
Start: 2017-09-15 | End: 2017-10-13

## 2017-09-15 RX ORDER — MEPERIDINE HYDROCHLORIDE 25 MG/ML
12.5 INJECTION INTRAMUSCULAR; INTRAVENOUS; SUBCUTANEOUS
Status: DISCONTINUED | OUTPATIENT
Start: 2017-09-15 | End: 2017-09-15 | Stop reason: HOSPADM

## 2017-09-15 RX ORDER — CEFAZOLIN SODIUM 2 G/100ML
2 INJECTION, SOLUTION INTRAVENOUS
Status: COMPLETED | OUTPATIENT
Start: 2017-09-15 | End: 2017-09-15

## 2017-09-15 RX ADMIN — DEXAMETHASONE SODIUM PHOSPHATE 8 MG: 10 INJECTION, SOLUTION INTRAMUSCULAR; INTRAVENOUS at 13:12

## 2017-09-15 RX ADMIN — CEFAZOLIN SODIUM 2 G: 2 INJECTION, SOLUTION INTRAVENOUS at 13:07

## 2017-09-15 RX ADMIN — LIDOCAINE HYDROCHLORIDE 1 ML: 10 INJECTION, SOLUTION EPIDURAL; INFILTRATION; INTRACAUDAL; PERINEURAL at 12:41

## 2017-09-15 RX ADMIN — MIDAZOLAM HYDROCHLORIDE 2 MG: 1 INJECTION, SOLUTION INTRAMUSCULAR; INTRAVENOUS at 12:57

## 2017-09-15 RX ADMIN — LIDOCAINE HYDROCHLORIDE 40 MG: 20 INJECTION, SOLUTION INFILTRATION; PERINEURAL at 13:05

## 2017-09-15 RX ADMIN — OXYCODONE HYDROCHLORIDE 5 MG: 5 TABLET ORAL at 14:41

## 2017-09-15 RX ADMIN — SODIUM CHLORIDE, POTASSIUM CHLORIDE, SODIUM LACTATE AND CALCIUM CHLORIDE: 600; 310; 30; 20 INJECTION, SOLUTION INTRAVENOUS at 12:41

## 2017-09-15 RX ADMIN — FENTANYL CITRATE 50 MCG: 50 INJECTION, SOLUTION INTRAMUSCULAR; INTRAVENOUS at 13:00

## 2017-09-15 RX ADMIN — PROPOFOL 150 MG: 10 INJECTION, EMULSION INTRAVENOUS at 13:05

## 2017-09-15 RX ADMIN — ONDANSETRON 4 MG: 2 INJECTION INTRAMUSCULAR; INTRAVENOUS at 13:18

## 2017-09-15 NOTE — IP AVS SNAPSHOT
MRN:[id number]                      After Visit Summary   9/15/2017    Merari Cowart    MRN: [id number]           Thank you!     Thank you for choosing [facility] for your care. Our goal is always to provide you with excellent care. Hearing back from our patients is one way we can continue to improve our services. Please take a few minutes to complete the written survey that you may receive in the mail after you visit with us. Thank you!        Patient Information     Date Of Birth          1965        About your hospital stay     You were admitted on:  September 15, 2017 You last received care in the:  [facility] Phase II    You were discharged on:  September 15, 2017       Who to Call     For medical emergencies, please call 911.  For non-urgent questions about your medical care, please call your primary care provider or clinic, [phone]  For questions related to your surgery, please call your surgery clinic        Attending Provider     Provider Specialty    Sky Cordova MD General Surgery       Primary Care Provider Office Phone # Fax #    Geronimo Óscar Greenberg [phone]      After Care Instructions     Diet Instructions       Resume pre-procedure diet            Discharge Instructions       Follow up appointment as instructed by Surgeon and or RN            Dressing       Keep dressing clean and dry.  Dressing / incisional care as instructed by RN and or Surgeon            Ice to affected area       Ice to operative site PRN            No Alcohol       For 24 hours post procedure            No driving or operating machinery        until the day after procedure            Shower       No shower for 24 hours post procedure. May shower Postoperative Day (POD) 1 - start sitz baths 2x daily and PRN starting tomorrow                  Your next 10 appointments already scheduled     Sep 25, 2017  1:30 PM CDT   Return Visit with Sky Cordova MD   [facility] ([facility])    [address] [id number] [phone]              Further instructions from your care team             Pending Results     No orders found from 9/13/2017 to 9/16/2017.            Admission Information     Date & Time Provider Department Dept. Phone    9/15/2017 Sky Cordova MD [facility] Phase [phone]      Your Vitals Were     Blood Pressure Pulse Temperature Respirations Last Period Pulse Oximetry    121/76 58 98  F (36.7  C) (Axillary) 12 07/22/2017 100%      MyChart Information     [employer] gives you secure access to your electronic health record. If you see a primary care provider, you can also send messages to your care team and make appointments. If you have questions, please call your primary care clinic.  If you do not have a primary care provider, please call [phone] and they will assist you.        Care EveryWhere ID     This is your Care EveryWhere ID. This could be used by other organizations to access your [facility] medical records  [id number]        Equal Access to Services     JORDAN PERSON : Hadii leesa klein Socaty, waaxda luqadaha, qaybta kaalmayessica adeclair, irving lubin . So [facility] [phone].    ATENCIÓN: Si habla español, tiene a gonzalez disposición servicios gratuitos de asistencia lingüística. Llame al [phone].    We comply with applicable federal civil rights laws and [place] laws. We do not discriminate on the basis of race, color, national origin, age, disability sex, sexual orientation or gender identity.               Review of your medicines      START taking        Dose / Directions    oxyCODONE 5 MG IR tablet   Commonly known as:  ROXICODONE   Used for:  Post-op pain        Dose:  5-10 mg   Take 1-2 tablets (5-10 mg) by mouth every 3 hours as needed for pain or other (Moderate to Severe)   Quantity:  30 tablet   Refills:  0         CONTINUE these  medicines which have NOT CHANGED        Dose / Directions    CAYENNE PEPPER PO        Refills:  0       hydrocortisone 2.5 % cream   Commonly known as:  ANUSOL-HC   Used for:  Prolapsed external hemorrhoids        Place rectally 2 times daily   Quantity:  30 g   Refills:  1       lidocaine (Anorectal) 5 % Crea   Used for:  Prolapsed external hemorrhoids        Apply to rectum twice daily.   Quantity:  15 g   Refills:  1       Multi-vitamin Tabs tablet        Dose:  1 tablet   Take 1 tablet by mouth daily   Refills:  0       PRO-BIOTIC BLEND PO        Refills:  0            Where to get your medicines      Some of these will need a paper prescription and others can be bought over the counter. Ask your nurse if you have questions.     Bring a paper prescription for each of these medications     oxyCODONE 5 MG IR tablet                Protect others around you: Learn how to safely use, store and throw away your medicines at www.disposemymeds.org.             Medication List: This is a list of all your medications and when to take them. Check marks below indicate your daily home schedule. Keep this list as a reference.      Medications           Morning Afternoon Evening Bedtime As Needed    CAYENNE PEPPER PO                                hydrocortisone 2.5 % cream   Commonly known as:  ANUSOL-HC   Place rectally 2 times daily                                lidocaine (Anorectal) 5 % Crea   Apply to rectum twice daily.                                Multi-vitamin Tabs tablet   Take 1 tablet by mouth daily                                oxyCODONE 5 MG IR tablet   Commonly known as:  ROXICODONE   Take 1-2 tablets (5-10 mg) by mouth every 3 hours as needed for pain or other (Moderate to Severe)                                PRO-BIOTIC BLEND PO

## 2017-09-15 NOTE — BRIEF OP NOTE
[facility] Brief Operative Note    Pre-operative diagnosis: Hemorrhoidal skin tag   Post-operative diagnosis Same   Procedure: Procedure(s):  excision of hemorrhoid skin tag - Wound Class: II-Clean Contaminated   Surgeon: Sky Cordova MD, FACS   Assistants(s): None   Estimated blood loss: Minimal    Specimens: Hemorrhoidal tag   Findings: Hemorrhoidal skin tag           Sky Cordova MD, FACS    #[id number]

## 2017-09-15 NOTE — ANESTHESIA POSTPROCEDURE EVALUATION
Patient: Merari Cowart    Procedure(s):  excision of hemorrhoid skin tag - Wound Class: II-Clean Contaminated    Diagnosis:Hemorrhoidal skin tag  Diagnosis Additional Information: No value filed.    Anesthesia Type:  General, LMA    Note:  Anesthesia Post Evaluation    Patient location during evaluation: Phase 2 and Bedside  Patient participation: Able to fully participate in evaluation  Level of consciousness: awake  Pain management: adequate  Airway patency: patent  Cardiovascular status: acceptable and hemodynamically stable  Respiratory status: acceptable, room air and nonlabored ventilation  Hydration status: stable  PONV: none     Anesthetic complications: None    Comments: Patient was happy with the anesthesia care received and no anesthesia related complications were noted.  I will follow up with the patient again if it is needed.        Last vitals:  Vitals:    09/15/17 1350 09/15/17 1355 09/15/17 1400   BP: 125/69 121/76 121/76   Pulse:      Resp: 18 18 12   Temp:      SpO2: 99% 100% 100%         Electronically Signed By: MISHA Call CRNA  September 15, 2017  2:48 PM

## 2017-09-15 NOTE — IP AVS SNAPSHOT
[facility] II    [address]     [place] [address]    Phone:  [phone]                                       After Visit Summary   9/15/2017    Merari Cowart    MRN: [id number]           After Visit Summary Signature Page     I have received my discharge instructions, and my questions have been answered. I have discussed any challenges I see with this plan with the nurse or doctor.    ..........................................................................................................................................  Patient/Patient Representative Signature      ..........................................................................................................................................  Patient Representative Print Name and Relationship to Patient    ..................................................               ................................................  Date                                            Time    ..........................................................................................................................................  Reviewed by Signature/Title    ...................................................              ..............................................  Date                                                            Time

## 2017-09-15 NOTE — ANESTHESIA CARE TRANSFER NOTE
Patient: Merari Cowart    Procedure(s):  excision of hemorrhoid skin tag - Wound Class: II-Clean Contaminated    Diagnosis: Hemorrhoidal skin tag  Diagnosis Additional Information: No value filed.    Anesthesia Type:   General, LMA     Note:  Airway :Face Mask and Oral Airway  Patient transferred to:PACU        Vitals: (Last set prior to Anesthesia Care Transfer)    CRNA VITALS  9/15/2017 1257 - 9/15/2017 1331      9/15/2017             Pulse: 67    SpO2: 100 %                Electronically Signed By: MISHA Call CRNA  September 15, 2017  1:31 PM

## 2017-09-15 NOTE — ANESTHESIA PREPROCEDURE EVALUATION
Anesthesia Evaluation     . Pt has had prior anesthetic.            ROS/MED HX    ENT/Pulmonary:  - neg pulmonary ROS     Neurologic:  - neg neurologic ROS     Cardiovascular:  - neg cardiovascular ROS   (+) ----. : . . . :. . No previous cardiac testing       METS/Exercise Tolerance:     Hematologic:  - neg hematologic  ROS       Musculoskeletal:  - neg musculoskeletal ROS       GI/Hepatic:  - neg GI/hepatic ROS       Renal/Genitourinary:  - ROS Renal section negative       Endo:  - neg endo ROS       Psychiatric:  - neg psychiatric ROS       Infectious Disease:  - neg infectious disease ROS       Malignancy:      - no malignancy   Other:    - neg other ROS                 Physical Exam  Normal systems: cardiovascular, pulmonary and dental    Airway   Mallampati: II  TM distance: >3 FB  Neck ROM: full    Dental     Cardiovascular   Rhythm and rate: regular and normal      Pulmonary    breath sounds clear to auscultation                    Anesthesia Plan      History & Physical Review  History and physical reviewed and following examination; no interval change.    ASA Status:  1 .    NPO Status:  > 8 hours    Plan for General and LMA with Intravenous and Propofol induction. Maintenance will be Balanced.    PONV prophylaxis:  Ondansetron (or other 5HT-3) and Dexamethasone or Solumedrol       Postoperative Care  Postoperative pain management:  IV analgesics and Oral pain medications.      Consents  Anesthetic plan, risks, benefits and alternatives discussed with:  Patient.  Use of blood products discussed: No .   .                          .

## 2017-09-18 NOTE — OP NOTE
DATE OF PROCEDURE:  9/15/2017      PREOPERATIVE DIAGNOSIS:  Hemorrhoidal skin tag.        POSTOPERATIVE DIAGNOSIS:  Hemorrhoidal skin tag.      PROCEDURE:  Excision of hemorrhoidal skin tag.      SURGEON:  Liam Miles MD      ANESTHESIA:  LMA.      INDICATIONS FOR PROCEDURE:  Merari Arora is a 52-year-old lady with a history of hemorrhoids in the distant past.  She had a residual tag at the 12 o'clock position, was kind of irritating her, and she opted to have it excised.      OPERATIVE FINDINGS:  Hemorrhoidal skin tag at the 12 o'clock position.      DETAILS OF PROCEDURE:  The patient was taken to the operating room and placed on the table in supine position.  After induction of general anesthesia, was placed in lithotomy position.  The perirectal area was prepped and draped in sterile fashion.  A timeout was performed confirming the identity of the patient as well as the procedure to be performed.  The area around the tag was infiltrated with local anesthetic.  It was grasped using an Allis clamp and it was then removed from the skin using cautery.  The defect was then closed using a running locking 3-0 Vicryl.  Sterile dressing was applied and the patient was then taken from the operating room to the recovery room in stable condition to be sent home.         LIAM MILES MD             D: 09/15/2017 13:29   T: 09/15/2017 21:00   MT: [id number]#[id number]      Name:     MERARI ARORA   MRN:      [id number]        Account:        [id number]   :      1965           Procedure Date: 09/15/2017      Document: [id number]

## 2017-09-20 LAB — COPATH REPORT: NORMAL

## 2017-09-25 ENCOUNTER — OFFICE VISIT (OUTPATIENT)
Dept: SURGERY | Facility: CLINIC | Age: 52
End: 2017-09-25
Payer: COMMERCIAL

## 2017-09-25 VITALS — WEIGHT: 162 LBS | TEMPERATURE: 97.2 F | BODY MASS INDEX: 26.96 KG/M2 | OXYGEN SATURATION: 96 % | HEART RATE: 80 BPM

## 2017-09-25 DIAGNOSIS — K64.4 EXTERNAL HEMORRHOID: Primary | ICD-10-CM

## 2017-09-25 PROCEDURE — 99024 POSTOP FOLLOW-UP VISIT: CPT | Performed by: SPECIALIST

## 2017-09-25 NOTE — NURSING NOTE
"Chief Complaint   Patient presents with     Surgical Followup     excision of hemorrhoid skin tag  DOS 9-       Initial Pulse 80  Temp 97.2  F (36.2  C) (Temporal)  Wt 162 lb (73.5 kg)  SpO2 96%  BMI 26.96 kg/m2 Estimated body mass index is 26.96 kg/(m^2) as calculated from the following:    Height as of 8/18/17: 5' 5\" (1.651 m).    Weight as of this encounter: 162 lb (73.5 kg).  Medication Reconciliation: complete    "

## 2017-09-25 NOTE — PROGRESS NOTES
F/U for excision of hemorrhoidal tag    Subjective:   Pt feels ok.  C/o ext hemorrhoids      Objective:  B/P: Data Unavailable, T: 97.2, P: 80, R: Data Unavailable  Rectal - new ext hemorrhoids    Assessment/Plan:  S/P excision of hemorrhoidal tag.  Now returns with hemorrhoids.  Will start anusol.  Cont sitz baths.  F/U 2-3 weeks.    Sky Cordova MD, FACS

## 2017-09-25 NOTE — MR AVS SNAPSHOT
After Visit Summary   9/25/2017    Merari Cowart    MRN: [id number]           Patient Information     Date Of Birth          1965        Visit Information        Provider Department      9/25/2017 1:30 PM Sky Cordova MD [facility]        Today's Diagnoses     External hemorrhoid    -  1       Follow-ups after your visit        Your next 10 appointments already scheduled     Oct 09, 2017  3:15 PM CDT   Return Visit with Sky Cordova MD   [facility] ([facility])    [address] [place] [id number] [phone]              Who to contact     If you have questions or need follow up information about today's clinic visit or your schedule please contact [facility] directly at [phone].  Normal or non-critical lab and imaging results will be communicated to you by MyChart, letter or phone within 4 business days after the clinic has received the results. If you do not hear from us within 7 days, please contact the clinic through MyChart or phone. If you have a critical or abnormal lab result, we will notify you by phone as soon as possible.  Submit refill requests through [employer] or call your pharmacy and they will forward the refill request to us. Please allow 3 business days for your refill to be completed.          Additional Information About Your Visit        MyChart Information     [employer] gives you secure access to your electronic health record. If you see a primary care provider, you can also send messages to your care team and make appointments. If you have questions, please call your primary care clinic.  If you do not have a primary care provider, please call [phone] and they will assist you.        Care EveryWhere ID     This is your Care EveryWhere ID. This could be used by other organizations to access your [place] medical records  [id number]        Your Vitals Were     Pulse Temperature  Pulse Oximetry BMI (Body Mass Index)          80 97.2  F (36.2  C) (Temporal) 96% 26.96 kg/m2         Blood Pressure from Last 3 Encounters:   09/15/17 121/76   08/18/17 108/60   06/19/17 122/70    Weight from Last 3 Encounters:   09/25/17 162 lb (73.5 kg)   08/18/17 163 lb (73.9 kg)   07/10/17 165 lb 6.4 oz (75 kg)              Today, you had the following     No orders found for display         Today's Medication Changes          These changes are accurate as of: 9/25/17  1:50 PM.  If you have any questions, ask your nurse or doctor.               These medicines have changed or have updated prescriptions.        Dose/Directions    * hydrocortisone 2.5 % cream   Commonly known as:  ANUSOL-HC   This may have changed:  Another medication with the same name was added. Make sure you understand how and when to take each.   Used for:  Prolapsed external hemorrhoids   Changed by:  Geronimo Greenberg MD        Place rectally 2 times daily   Quantity:  30 g   Refills:  1       * hydrocortisone 2.5 % cream   Commonly known as:  ANUSOL-HC   This may have changed:  You were already taking a medication with the same name, and this prescription was added. Make sure you understand how and when to take each.   Used for:  External hemorrhoid   Changed by:  Sky Cordova MD        Place rectally 2 times daily   Quantity:  30 g   Refills:  3       * Notice:  This list has 2 medication(s) that are the same as other medications prescribed for you. Read the directions carefully, and ask your doctor or other care provider to review them with you.         Where to get your medicines      These medications were sent to [facility] [address] [place], [place] - [address]  [address] [facility] [address]     Phone:  [phone]     hydrocortisone 2.5 % cream                Primary Care Provider Office Phone # Fax #    Geronimo Greenberg [phone]       [address]   [place] [place] [address]        Equal  Access to Services     [facility]: Hadii leesa morillo latoya Kamara, wavicenteda luqadaha, qaybta kaaliveth alison, irving ross. So [facility] [phone].    ATENCIÓN: Si bartolola galina, tiene a gonzalez disposición servicios gratuitos de asistencia lingüística. Llame al [phone].    We comply with applicable federal civil rights laws and [place] laws. We do not discriminate on the basis of race, color, national origin, age, disability sex, sexual orientation or gender identity.            Thank you!     Thank you for choosing [facility]  for your care. Our goal is always to provide you with excellent care. Hearing back from our patients is one way we can continue to improve our services. Please take a few minutes to complete the written survey that you may receive in the mail after your visit with us. Thank you!             Your Updated Medication List - Protect others around you: Learn how to safely use, store and throw away your medicines at www.disposemymeds.org.          This list is accurate as of: 9/25/17  1:50 PM.  Always use your most recent med list.                   Brand Name Dispense Instructions for use Diagnosis    CAYENNE PEPPER PO           * hydrocortisone 2.5 % cream    ANUSOL-HC    30 g    Place rectally 2 times daily    Prolapsed external hemorrhoids       * hydrocortisone 2.5 % cream    ANUSOL-HC    30 g    Place rectally 2 times daily    External hemorrhoid       lidocaine (Anorectal) 5 % Crea     15 g    Apply to rectum twice daily.    Prolapsed external hemorrhoids       Multi-vitamin Tabs tablet      Take 1 tablet by mouth daily        oxyCODONE 5 MG IR tablet    ROXICODONE    30 tablet    Take 1-2 tablets (5-10 mg) by mouth every 3 hours as needed for pain or other (Moderate to Severe)    Post-op pain       PRO-BIOTIC BLEND PO           * Notice:  This list has 2 medication(s) that are the same as other medications prescribed for you. Read the  directions carefully, and ask your doctor or other care provider to review them with you.

## 2017-10-13 ENCOUNTER — OFFICE VISIT (OUTPATIENT)
Dept: SURGERY | Facility: CLINIC | Age: 52
End: 2017-10-13
Payer: COMMERCIAL

## 2017-10-13 VITALS — BODY MASS INDEX: 27.47 KG/M2 | OXYGEN SATURATION: 99 % | HEART RATE: 77 BPM | TEMPERATURE: 97.8 F | WEIGHT: 165.1 LBS

## 2017-10-13 DIAGNOSIS — Z98.890 POST-OPERATIVE STATE: Primary | ICD-10-CM

## 2017-10-13 PROCEDURE — 99212 OFFICE O/P EST SF 10 MIN: CPT | Performed by: SPECIALIST

## 2017-10-13 NOTE — MR AVS SNAPSHOT
After Visit Summary   10/13/2017    Merari Cowart    MRN: [id number]           Patient Information     Date Of Birth          1965        Visit Information        Provider Department      10/13/2017 11:15 AM Sky Cordova MD [facility]         Follow-ups after your visit        Who to contact     If you have questions or need follow up information about today's clinic visit or your schedule please contact [facility] directly at [phone].  Normal or non-critical lab and imaging results will be communicated to you by [employer] Network[employer], letter or phone within 4 business days after the clinic has received the results. If you do not hear from us within 7 days, please contact the clinic through [employer] or phone. If you have a critical or abnormal lab result, we will notify you by phone as soon as possible.  Submit refill requests through [employer] or call your pharmacy and they will forward the refill request to us. Please allow 3 business days for your refill to be completed.          Additional Information About Your Visit        MyChart Information     [employer] gives you secure access to your electronic health record. If you see a primary care provider, you can also send messages to your care team and make appointments. If you have questions, please call your primary care clinic.  If you do not have a primary care provider, please call [phone] and they will assist you.        Care EveryWhere ID     This is your Care EveryWhere ID. This could be used by other organizations to access your [place] medical records  [id number]        Your Vitals Were     Pulse Temperature Pulse Oximetry BMI (Body Mass Index)          77 97.8  F (36.6  C) (Temporal) 99% 27.47 kg/m2         Blood Pressure from Last 3 Encounters:   09/15/17 121/76   08/18/17 108/60   06/19/17 122/70    Weight from Last 3 Encounters:   10/13/17 165 lb 1.6 oz (74.9 kg)   09/25/17 162 lb (73.5 kg)    08/18/17 163 lb (73.9 kg)              Today, you had the following     No orders found for display       Primary Care Provider Office Phone # Fax #    Geronimo Greenberg [phone] [address] [place] [address]        Equal Access to Services     JORDAN PERSON : Hadii aad ku hadasho Soomaali, waaxda luqadaha, qaybta kaalmada adeegyada, waxay asain haykellien evan jaydecodi lasusan ross. So [facility] [phone].    ATENCIÓN: Si habla español, tiene a gonzalez disposición servicios gratuitos de asistencia lingüística. [facility] [phone].    We comply with applicable federal civil rights laws and [place] laws. We do not discriminate on the basis of race, color, national origin, age, disability, sex, sexual orientation, or gender identity.            Thank you!     Thank you for choosing [facility]  for your care. Our goal is always to provide you with excellent care. Hearing back from our patients is one way we can continue to improve our services. Please take a few minutes to complete the written survey that you may receive in the mail after your visit with us. Thank you!             Your Updated Medication List - Protect others around you: Learn how to safely use, store and throw away your medicines at www.disposemymeds.org.          This list is accurate as of: 10/13/17 11:23 AM.  Always use your most recent med list.                   Brand Name Dispense Instructions for use Diagnosis    CAYENNE PEPPER PO           * hydrocortisone 2.5 % cream    ANUSOL-HC    30 g    Place rectally 2 times daily    Prolapsed external hemorrhoids       * hydrocortisone 2.5 % cream    ANUSOL-HC    30 g    Place rectally 2 times daily    External hemorrhoid       lidocaine (Anorectal) 5 % Crea     15 g    Apply to rectum twice daily.    Prolapsed external hemorrhoids       Multi-vitamin Tabs tablet      Take 1 tablet by mouth daily        PRO-BIOTIC BLEND PO           * Notice:  This list has 2  medication(s) that are the same as other medications prescribed for you. Read the directions carefully, and ask your doctor or other care provider to review them with you.

## 2017-10-13 NOTE — NURSING NOTE
"Chief Complaint   Patient presents with     Surgical Followup     excision of hemorrhoid skin tag  DOS 9-       Initial Pulse 77  Temp 97.8  F (36.6  C) (Temporal)  Wt 165 lb 1.6 oz (74.9 kg)  SpO2 99%  BMI 27.47 kg/m2 Estimated body mass index is 27.47 kg/(m^2) as calculated from the following:    Height as of 8/18/17: 5' 5\" (1.651 m).    Weight as of this encounter: 165 lb 1.6 oz (74.9 kg).  Medication Reconciliation: complete    "

## 2017-10-13 NOTE — PROGRESS NOTES
F/U for excision of hemorrhoidal tag    Subjective:   Pt feels much better    Objective:  B/P: Data Unavailable, T: 97.8, P: 77, R: Data Unavailable  Rectal - new ext hemorrhoids - almost completely resolved    Assessment/Plan:  S/P excision of hemorrhoidal tag. Had some ext hemorrhoids - much improved.  Will cont anusol.  Cont sitz baths.  F/U 3-4 weeks if they remain    Sky Cordova MD, FACS

## 2018-06-15 ENCOUNTER — HOSPITAL ENCOUNTER (OUTPATIENT)
Dept: MAMMOGRAPHY | Facility: CLINIC | Age: 53
Discharge: HOME | End: 2018-06-15
Attending: FAMILY MEDICINE | Admitting: FAMILY MEDICINE
Payer: COMMERCIAL

## 2018-06-15 DIAGNOSIS — Z12.31 VISIT FOR SCREENING MAMMOGRAM: ICD-10-CM

## 2018-06-15 PROCEDURE — 77067 SCR MAMMO BI INCL CAD: CPT

## 2018-06-22 ENCOUNTER — OFFICE VISIT (OUTPATIENT)
Dept: FAMILY MEDICINE | Facility: CLINIC | Age: 53
End: 2018-06-22
Payer: COMMERCIAL

## 2018-06-22 VITALS
HEART RATE: 78 BPM | OXYGEN SATURATION: 100 % | TEMPERATURE: 98.7 F | BODY MASS INDEX: 27.99 KG/M2 | WEIGHT: 168 LBS | RESPIRATION RATE: 16 BRPM | HEIGHT: 65 IN | SYSTOLIC BLOOD PRESSURE: 114 MMHG | DIASTOLIC BLOOD PRESSURE: 70 MMHG

## 2018-06-22 DIAGNOSIS — Z00.8 ENCOUNTER FOR BIOMETRIC SCREENING: ICD-10-CM

## 2018-06-22 DIAGNOSIS — L30.9 ECZEMA, UNSPECIFIED TYPE: ICD-10-CM

## 2018-06-22 DIAGNOSIS — Z13.6 CARDIOVASCULAR SCREENING; LDL GOAL LESS THAN 130: ICD-10-CM

## 2018-06-22 DIAGNOSIS — Z00.01 ENCOUNTER FOR ROUTINE ADULT HEALTH EXAMINATION WITH ABNORMAL FINDINGS: Primary | ICD-10-CM

## 2018-06-22 LAB
CHOLEST SERPL-MCNC: 239 MG/DL
GLUCOSE SERPL-MCNC: 91 MG/DL (ref 70–99)
HDLC SERPL-MCNC: 69 MG/DL
LDLC SERPL CALC-MCNC: 142 MG/DL
NONHDLC SERPL-MCNC: 170 MG/DL
TRIGL SERPL-MCNC: 141 MG/DL

## 2018-06-22 PROCEDURE — 36415 COLL VENOUS BLD VENIPUNCTURE: CPT | Performed by: FAMILY MEDICINE

## 2018-06-22 PROCEDURE — 80061 LIPID PANEL: CPT | Performed by: FAMILY MEDICINE

## 2018-06-22 PROCEDURE — 99396 PREV VISIT EST AGE 40-64: CPT | Performed by: FAMILY MEDICINE

## 2018-06-22 PROCEDURE — 82947 ASSAY GLUCOSE BLOOD QUANT: CPT | Performed by: FAMILY MEDICINE

## 2018-06-22 RX ORDER — TRIAMCINOLONE ACETONIDE 1 MG/G
OINTMENT TOPICAL
Qty: 15 G | Refills: 1 | Status: SHIPPED | OUTPATIENT
Start: 2018-06-22 | End: 2019-07-12

## 2018-06-22 ASSESSMENT — PAIN SCALES - GENERAL: PAINLEVEL: NO PAIN (0)

## 2018-06-22 NOTE — PROGRESS NOTES
Merari,  Your results show that your cholesterol is mildly elevated but of no clinical concern.  Your blood sugar was fine.  I will have my staff complete your work form with your lab work noted.  Please let me know if you have any questions.    Sincerely,  Dr. Greenberg

## 2018-06-22 NOTE — MR AVS SNAPSHOT
After Visit Summary   6/22/2018    Merari Cowart    MRN: [id number]           Patient Information     Date Of Birth          1965        Visit Information        Provider Department      6/22/2018 9:30 AM Geronimo Greenberg MD [facility]        Today's Diagnoses     Encounter for routine adult health examination with abnormal findings    -  1    Encounter for biometric screening        Eczema, unspecified type        CARDIOVASCULAR SCREENING; LDL GOAL LESS THAN 130          Care Instructions    Vaseline, Vanicream, CeraVe for dry skin behind left ear.          Follow-ups after your visit        Who to contact     If you have questions or need follow up information about today's clinic visit or your schedule please contact [facility] directly at [phone].  Normal or non-critical lab and imaging results will be communicated to you by [employer]ing[employer], letter or phone within 4 business days after the clinic has received the results. If you do not hear from us within 7 days, please contact the clinic through [employer] or phone. If you have a critical or abnormal lab result, we will notify you by phone as soon as possible.  Submit refill requests through [employer] or call your pharmacy and they will forward the refill request to us. Please allow 3 business days for your refill to be completed.          Additional Information About Your Visit        MyChart Information     [employer] gives you secure access to your electronic health record. If you see a primary care provider, you can also send messages to your care team and make appointments. If you have questions, please call your primary care clinic.  If you do not have a primary care provider, please call [phone] and they will assist you.        Care EveryWhere ID     This is your Care EveryWhere ID. This could be used by other organizations to access your [place] medical records  [id number]        Your Vitals  "Were     Pulse Temperature Respirations Height Last Period Pulse Oximetry    78 98.7  F (37.1  C) (Temporal) 16 5' 5\" (1.651 m) 06/01/2018 100%    Breastfeeding? BMI (Body Mass Index)                No 27.96 kg/m2           Blood Pressure from Last 3 Encounters:   06/22/18 114/70   09/15/17 121/76   08/18/17 108/60    Weight from Last 3 Encounters:   06/22/18 168 lb (76.2 kg)   10/13/17 165 lb 1.6 oz (74.9 kg)   09/25/17 162 lb (73.5 kg)              We Performed the Following     Glucose     Lipid Profile          Today's Medication Changes          These changes are accurate as of 6/22/18  4:00 PM.  If you have any questions, ask your nurse or doctor.               Start taking these medicines.        Dose/Directions    triamcinolone 0.1 % ointment   Commonly known as:  KENALOG   Used for:  Eczema, unspecified type   Started by:  Geronimo Greenberg MD        Apply sparingly to affected area twice daily as needed.   Quantity:  15 g   Refills:  1         Stop taking these medicines if you haven't already. Please contact your care team if you have questions.     hydrocortisone 2.5 % cream   Commonly known as:  ANUSOL-HC   Stopped by:  Geronimo Greenberg MD           lidocaine (Anorectal) 5 % Crea   Stopped by:  Geronimo Greenberg MD                Where to get your medicines      These medications were sent to [facility] [address] [place] [address] [address]  [address] [address]     Phone:  [phone]     triamcinolone 0.1 % ointment                Primary Care Provider Office Phone # Fax #    Geronimo Greenberg [phone] [address] [facility] [address]   [place] [address]        Equal Access to Services     LUISA PERSON AH: Lulu Kamara, wachandra jeff, qaybta kaalmada alison, irving ross. So [facility] [phone].    ATENCIÓN: Si habla español, tiene a gonzalez disposición servicios gratuitos de asistencia lingüística. Llame al " [phone].    We comply with applicable federal civil rights laws and [place] laws. We do not discriminate on the basis of race, color, national origin, age, disability, sex, sexual orientation, or gender identity.            Thank you!     Thank you for choosing [facility]  for your care. Our goal is always to provide you with excellent care. Hearing back from our patients is one way we can continue to improve our services. Please take a few minutes to complete the written survey that you may receive in the mail after your visit with us. Thank you!             Your Updated Medication List - Protect others around you: Learn how to safely use, store and throw away your medicines at www.disposemymeds.org.          This list is accurate as of 6/22/18  4:00 PM.  Always use your most recent med list.                   Brand Name Dispense Instructions for use Diagnosis    CAYENNE PEPPER PO           Multi-vitamin Tabs tablet      Take 1 tablet by mouth daily        PRO-BIOTIC BLEND PO           triamcinolone 0.1 % ointment    KENALOG    15 g    Apply sparingly to affected area twice daily as needed.    Eczema, unspecified type

## 2018-06-22 NOTE — PROGRESS NOTES
SUBJECTIVE:   CC: Merari Cowart is an 52 year old woman who presents for preventive health visit.     Physical   Annual:     Getting at least 3 servings of Calcium per day::  Yes    Bi-annual eye exam::  Yes    Dental care twice a year::  Yes    Sleep apnea or symptoms of sleep apnea::  None    Diet::  Regular (no restrictions)    Frequency of exercise::  4-5 days/week    Duration of exercise::  15-30 minutes    Taking medications regularly::  Yes    Medication side effects::  Not applicable            Patient is a 52 year old woman who presents to clinic for a preventative health visit. She has a few concerns. First concern is a rash behind her left ear. She has had the rash for a number of years. It is itchy and occasionally oozes serous fluid when she has been scratching it a lot. The rash is not anywhere else on her body.     Patient also here for right second toenail changes. Her toenail has been thickened and discolored for a number of years. She has tried Lamisil for this in the past without change in the nail. Patient endorses trauma to right great toe, but does not recall injuring the second toe. No pain with this. She has not seen a podiatrist in the past.    Today's PHQ-2 Score:   PHQ-2 ( 1999 [employer]) 6/19/2018   Q1: Little interest or pleasure in doing things 0   Q2: Feeling down, depressed or hopeless 0   PHQ-2 Score 0   Q1: Little interest or pleasure in doing things Not at all   Q2: Feeling down, depressed or hopeless Not at all   PHQ-2 Score 0       Abuse: Current or Past(Physical, Sexual or Emotional)- No  Do you feel safe in your environment - Yes    Social History   Substance Use Topics     Smoking status: Never Smoker     Smokeless tobacco: Never Used     Alcohol use 0.0 oz/week     0 Standard drinks or equivalent per week      Comment: 6/month     Alcohol Use 6/19/2018   If you drink alcohol do you typically have greater than 3 drinks per day OR greater than 7 drinks per week? No  "      Reviewed orders with patient.  Reviewed health maintenance and updated orders accordingly - Yes    Mammogram not appropriate for this patient based on age.    Pertinent mammograms are reviewed under the imaging tab.  History of abnormal Pap smear: NO  Reviewed and updated as needed this visit by clinical staff  Tobacco  Allergies  Meds  Problems  Med Hx  Surg Hx  Fam Hx  Soc Hx          Reviewed and updated as needed this visit by Provider  Allergies  Meds  Problems  Med Hx  Surg Hx        History reviewed. No pertinent past medical history.   Past Surgical History:   Procedure Laterality Date     APPENDECTOMY  1992     COLONOSCOPY N/A 6/20/2016    Procedure: COLONOSCOPY;  Surgeon: Danny Caballero MD;  Location: [facility]     EXCISE MASS BACK N/A 9/28/2016    Procedure: EXCISE MASS BACK;  Surgeon: Sky Cordova MD;  Location: [facility] OR     HEMORRHOIDECTOMY EXTERNAL N/A 9/15/2017    Procedure: HEMORRHOIDECTOMY EXTERNAL;  excision of hemorrhoid skin tag;  Surgeon: Sky Cordova MD;  Location: [facility] OR       Review of Systems  CONSTITUTIONAL: NEGATIVE for fever, chills, change in weight  INTEGUMENTARY/SKIN: POSITIVE for rash behind left ear.   EYES: NEGATIVE for vision changes or irritation  ENT: NEGATIVE for ear, mouth and throat problems  RESP: NEGATIVE for significant cough or SOB  BREAST: NEGATIVE for masses, tenderness or discharge  CV: NEGATIVE for chest pain, palpitations or peripheral edema  GI: NEGATIVE for nausea, abdominal pain, heartburn, or change in bowel habits  : NEGATIVE for unusual urinary or vaginal symptoms. No vaginal bleeding.  MUSCULOSKELETAL: NEGATIVE for significant arthralgias or myalgia  NEURO: NEGATIVE for weakness, dizziness or paresthesias  PSYCHIATRIC: NEGATIVE for changes in mood or affect      OBJECTIVE:   /70  Pulse 78  Temp 98.7  F (37.1  C) (Temporal)  Resp 16  Ht 5' 5\" (1.651 m)  Wt 168 lb (76.2 kg)  LMP 06/01/2018  SpO2 100%  Breastfeeding? No  " "BMI 27.96 kg/m2  Physical Exam  GENERAL: healthy, alert and no distress  EYES: Eyes grossly normal to inspection, PERRL and conjunctivae and sclerae normal  HENT: ear canals and TM's normal, nose and mouth without ulcers or lesions  NECK: no adenopathy, no asymmetry, masses, or scars and thyroid normal to palpation  RESP: lungs clear to auscultation - no rales, rhonchi or wheezes  CV: regular rate and rhythm, normal S1 S2, no S3 or S4, no murmur, click or rub, no peripheral edema and peripheral pulses strong  ABDOMEN: soft, nontender, no hepatosplenomegaly, no masses and bowel sounds normal  MS: no gross musculoskeletal defects noted, no edema  SKIN: white scaling behind left ear, no signs of cellulitis  NAIL: Right second toenail dystrophy, right great toe with healing subungal hematoma.   NEURO: Normal strength and tone, mentation intact and speech normal  PSYCH: mentation appears normal, affect normal/bright    ASSESSMENT/PLAN:       ICD-10-CM    1. Encounter for routine adult health examination with abnormal findings Z00.01    2. Encounter for biometric screening Z01.89 Lipid Profile     Glucose   3. Eczema, unspecified type L30.9 triamcinolone (KENALOG) 0.1 % ointment   4. CARDIOVASCULAR SCREENING; LDL GOAL LESS THAN 130 Z13.6      Patient here for a routine health maintenance exam. Overall, she is doing well. Pruritic rash behind her left ear consistent with eczema. Recommend triamcinolone ointment PRN. Discussed options for dystrophic toenail. She would like to see podiatry, information provided. Lipid panel and glucose ordered. Follow up in one year for routine physical exam.     COUNSELING:  Reviewed preventive health counseling, as reflected in patient instructions       reports that she has never smoked. She has never used smokeless tobacco.    Estimated body mass index is 27.96 kg/(m^2) as calculated from the following:    Height as of this encounter: 5' 5\" (1.651 m).    Weight as of this encounter: 168 " lb (76.2 kg).   Weight management plan: Discussed healthy diet and exercise guidelines and patient will follow up in 12 months in clinic to re-evaluate.    Counseling Resources:  ATP IV Guidelines  Pooled Cohorts Equation Calculator  Breast Cancer Risk Calculator  FRAX Risk Assessment  ICSI Preventive Guidelines  Dietary Guidelines for Americans, 2010  USDA's MyPlate  ASA Prophylaxis  Lung CA Screening    Lindsey Akhtar, MS4    I,  Geronimo Greenberg MD,  was present with the medical student who participated in the service and in the documentation of the note. I have verified the history and personally performed the physical exam and medical decision making. I agree with the assessment and plan of care as documented in the note.       Geronimo Greenberg MD  [facility]  Answers for HPI/ROS submitted by the patient on 6/19/2018   PHQ-2 Score: 0

## 2018-08-06 ENCOUNTER — OFFICE VISIT (OUTPATIENT)
Dept: PODIATRY | Facility: CLINIC | Age: 53
End: 2018-08-06
Payer: COMMERCIAL

## 2018-08-06 VITALS
WEIGHT: 170 LBS | DIASTOLIC BLOOD PRESSURE: 70 MMHG | SYSTOLIC BLOOD PRESSURE: 120 MMHG | BODY MASS INDEX: 28.32 KG/M2 | HEIGHT: 65 IN

## 2018-08-06 DIAGNOSIS — M20.41 HAMMER TOES OF BOTH FEET: ICD-10-CM

## 2018-08-06 DIAGNOSIS — M20.11 HALLUX VALGUS OF RIGHT FOOT: ICD-10-CM

## 2018-08-06 DIAGNOSIS — L60.3 ONYCHODYSTROPHY: Primary | ICD-10-CM

## 2018-08-06 DIAGNOSIS — M20.42 HAMMER TOES OF BOTH FEET: ICD-10-CM

## 2018-08-06 PROCEDURE — 99243 OFF/OP CNSLTJ NEW/EST LOW 30: CPT | Performed by: PODIATRIST

## 2018-08-06 ASSESSMENT — PAIN SCALES - GENERAL: PAINLEVEL: NO PAIN (0)

## 2018-08-06 NOTE — PATIENT INSTRUCTIONS
"Nail Debridement    A high quality instrument makes trimming toenails MUCH easier.  Search [employer] for any 5\" nail nipper manufactured by reliable brands such as Miltex, Integra or Jarit as these quality instruments will help manage difficult nails more effectively and comfortably. We use Miltex -SS.  A physician is not necessary to trim nails even if you are taking blood thinners or are diabetic.  Your family or care givers may help manage your toenails.      Trim or sand the nails once weekly.  Do not wait until they are long and painful or trimming will become too difficult and painful and will increase your risk of complications or infection.  A course file or 120 grit sandpaper on a sanding block can be helpful.  For very thick nails many people prefer battery operated ashley such as an Amope', Personal Pedi and Emjoi for regular use or heavy painful callouses or thick toenails.    Trim or skive any portion of nail that is thick, loose, crumbling, or not well attached. Do not tear the nail away, but rather cut them with a nail nipperor sand or sand them down.  You may follow up with your Podiatric Physician if you have pain, bleeding, infection, questions or other concerns.        "

## 2018-08-06 NOTE — LETTER
8/6/2018         RE: Merari Cowart  [address] [place] [address]        Dear Colleague,    Thank you for referring your patient, Merari Cowart, to the [facility]. Please see a copy of my visit note below.    HPI:  Merari Cowart is a 53 year old female who is seen in consultation at the request of Geronimo Greenberg MD.    Pt presents for eval of:   (Onset, Location, L/R, Character, Treatments, Injury if yes)     Ongoing, Right 2nd toenail, thick, discolored. No injury noted.  Intermittent, dull ache w/pressure    Works for [employer].    Weight management plan: Patient was referred to their PCP to discuss a diet and exercise plan.     Patient to follow up with Primary Care provider regarding elevated blood pressure.    ROS:  10 point ROS neg other than the symptoms noted above in the HPI.    Patient Active Problem List   Diagnosis     Eczema, behind left ear       PAST MEDICAL HISTORY: History reviewed. No pertinent past medical history.     PAST SURGICAL HISTORY:   Past Surgical History:   Procedure Laterality Date     APPENDECTOMY  1992     COLONOSCOPY N/A 6/20/2016    Procedure: COLONOSCOPY;  Surgeon: Danny Caballero MD;  [facility]     EXCISE MASS BACK N/A 9/28/2016    Procedure: EXCISE MASS BACK;  Surgeon: Sky Cordova MD;  [facility]     HEMORRHOIDECTOMY EXTERNAL N/A 9/15/2017    Procedure: HEMORRHOIDECTOMY EXTERNAL;  excision of hemorrhoid skin tag;  Surgeon: Sky Cordova MD;  [facility]        MEDICATIONS:   Current Outpatient Prescriptions:      Capsicum, Cayenne, (CAYENNE PEPPER PO), , Disp: , Rfl:      multivitamin, therapeutic with minerals (MULTI-VITAMIN) TABS, Take 1 tablet by mouth daily, Disp: , Rfl:      Probiotic Product (PRO-BIOTIC BLEND PO), , Disp: , Rfl:      triamcinolone (KENALOG) 0.1 % ointment, Apply sparingly to affected area twice daily as needed., Disp: 15 g, Rfl: 1     ALLERGIES:    Allergies   Allergen  "Reactions     No Known Drug Allergies         SOCIAL HISTORY:   Social History     Social History     Marital status:      Spouse name: N/A     Number of children: N/A     Years of education: N/A     Occupational History           works for [employer] from home     Social History Main Topics     Smoking status: Never Smoker     Smokeless tobacco: Never Used     Alcohol use 0.0 oz/week     0 Standard drinks or equivalent per week      Comment: 6/month     Drug use: No     Sexual activity: Yes     Partners: Male     Birth control/ protection: Surgical      Comment:  had vasectomy     Other Topics Concern     Not on file     Social History Narrative        FAMILY HISTORY:   Family History   Problem Relation Age of Onset     Colon Cancer Father      70s     Breast Cancer No family hx of      Coronary Artery Disease No family hx of      Hyperlipidemia No family hx of      Diabetes No family hx of         EXAM:Vitals: /70 (BP Location: Left arm, Cuff Size: Adult Regular)  Ht 5' 5\" (1.651 m)  Wt 170 lb (77.1 kg)  BMI 28.29 kg/m2  BMI= Body mass index is 28.29 kg/(m^2).    General appearance: Patient is alert and fully cooperative with history & exam.  No sign of distress is noted during the visit.     Psychiatric: Affect is pleasant & appropriate.  Patient appears motivated to improve health.     Respiratory: Breathing is regular & unlabored while sitting.     HEENT: Hearing is intact to spoken word.  Speech is clear.  No gross evidence of visual impairment that would impact ambulation.     Vascular: DP & PT pulses are intact & regular bilaterally.  No significant edema or varicosities noted.  CFT and skin temperature is normal to both lower extremities.     Neurologic: Lower extremity sensation is intact to light touch.  No evidence of weakness or contracture in the lower extremities.  No evidence of neuropathy.    Dermatologic: Skin is intact to both lower extremities with adequate texture, " turgor and tone about the integument.  Right first and second toenails are quite dystrophic.  50% of the right hallux nail has been previously avulsed and a new nail growing in.  No discoloration of the toenail.  The right second toenail is quite elongated forming a pincer nail.  There is subungual debris about the distal unattached portion.    Musculoskeletal: Patient is ambulatory without assistive device or brace.  Mild hallux valgus and hammertoe deformity however this is all flexible and reducible at this time.  No joint symptoms noted.     ASSESSMENT:       ICD-10-CM    1. Onychodystrophy L60.3    2. Hammer toes of both feet M20.41     M20.42    3. Hallux valgus of right foot M20.11         PLAN:  Reviewed patient's chart in [facility].      8/6/2018   Discussed etiology and treatment options.  This nail has been this shape for more than a decade without a significant change.  With the use of an antifungal this would reduce the fungal load but would not reduce the shape of the nail or improve the attachment or positioning or alignment of the nail.  We discussed appropriate hygiene management debridement and recommended abrasive debridement as well.  Written instructions dispensed.  We also discussed utilizing oral antifungals and what to expect over time as well as potential risks.  She would rather not utilize this medication.  We also discussed matrixectomy of the entire nail and what to expect in her lifetime after that.  All questions were answered and she will follow-up as needed.    Abe Chow DPM      Again, thank you for allowing me to participate in the care of your patient.        Sincerely,        Abe Chow DPM

## 2018-08-06 NOTE — MR AVS SNAPSHOT
"              After Visit Summary   8/6/2018    Merari Cowart    MRN: [id number]           Patient Information     Date Of Birth          1965        Visit Information        Provider Department      8/6/2018 3:00 PM Abe Chow DPM [facility]        Care Instructions    Nail Debridement    A high quality instrument makes trimming toenails MUCH easier.  Search ebay for any 5\" nail nipper manufactured by reliable brands such as Miltex, Integra or Jarit as these quality instruments will help manage difficult nails more effectively and comfortably. We use Miltex -SS.  A physician is not necessary to trim nails even if you are taking blood thinners or are diabetic.  Your family or care givers may help manage your toenails.      Trim or sand the nails once weekly.  Do not wait until they are long and painful or trimming will become too difficult and painful and will increase your risk of complications or infection.  A course file or 120 grit sandpaper on a sanding block can be helpful.  For very thick nails many people prefer battery operated ashley such as an Amope', Personal Pedi and Emjoi for regular use or heavy painful callouses or thick toenails.    Trim or skive any portion of nail that is thick, loose, crumbling, or not well attached. Do not tear the nail away, but rather cut them with a nail nipperor sand or sand them down.  You may follow up with your Podiatric Physician if you have pain, bleeding, infection, questions or other concerns.                Follow-ups after your visit        Who to contact     If you have questions or need follow up information about today's clinic visit or your schedule please contact [facility] directly at [phone].  Normal or non-critical lab and imaging results will be communicated to you by MyChart, letter or phone within 4 business days after the clinic has received the results. If you do not hear from us within 7 " "days, please contact the clinic through [employer] or phone. If you have a critical or abnormal lab result, we will notify you by phone as soon as possible.  Submit refill requests through [employer] or call your pharmacy and they will forward the refill request to us. Please allow 3 business days for your refill to be completed.          Additional Information About Your Visit        [employer] Information     [employer] gives you secure access to your electronic health record. If you see a primary care provider, you can also send messages to your care team and make appointments. If you have questions, please call your primary care clinic.  If you do not have a primary care provider, please call [phone] and they will assist you.        Care EveryWhere ID     This is your Care EveryWhere ID. This could be used by other organizations to access your [facility] medical records  [id number]        Your Vitals Were     Height BMI (Body Mass Index)                5' 5\" (1.651 m) 28.29 kg/m2           Blood Pressure from Last 3 Encounters:   08/06/18 120/70   06/22/18 114/70   09/15/17 121/76    Weight from Last 3 Encounters:   08/06/18 170 lb (77.1 kg)   06/22/18 168 lb (76.2 kg)   10/13/17 165 lb 1.6 oz (74.9 kg)              Today, you had the following     No orders found for display       Primary Care Provider Office Phone # Fax #    Geronimo Óscar Greenberg [phone]       [address] [place] [address]        Equal Access to Services     [facility] Hospital[facility] AH: Hadii aad ku hadasho Soomaali, waaxda luqadaha, qaybta kaalmada adeegyada, irving lubin . So [facility] [phone].    ATENCIÓN: Si habla español, tiene a gonzalez disposición servicios gratuitos de asistencia lingüística. Llame al [phone].    We comply with applicable federal civil rights laws and [place] laws. We do not discriminate on the basis of race, color, national origin, age, disability, sex, sexual orientation, or " gender identity.            Thank you!     Thank you for choosing [facility]  for your care. Our goal is always to provide you with excellent care. Hearing back from our patients is one way we can continue to improve our services. Please take a few minutes to complete the written survey that you may receive in the mail after your visit with us. Thank you!             Your Updated Medication List - Protect others around you: Learn how to safely use, store and throw away your medicines at www.disposemymeds.org.          This list is accurate as of 8/6/18  3:40 PM.  Always use your most recent med list.                   Brand Name Dispense Instructions for use Diagnosis    CAYENNE PEPPER PO           Multi-vitamin Tabs tablet      Take 1 tablet by mouth daily        PRO-BIOTIC BLEND PO           triamcinolone 0.1 % ointment    KENALOG    15 g    Apply sparingly to affected area twice daily as needed.    Eczema, unspecified type

## 2018-08-06 NOTE — PROGRESS NOTES
HPI:  eMrari Cowart is a 53 year old female who is seen in consultation at the request of Geronimo Greenberg MD.    Pt presents for eval of:   (Onset, Location, L/R, Character, Treatments, Injury if yes)     Ongoing, Right 2nd toenail, thick, discolored. No injury noted.  Intermittent, dull ache w/pressure    Works for [employer].    Weight management plan: Patient was referred to their PCP to discuss a diet and exercise plan.     Patient to follow up with Primary Care provider regarding elevated blood pressure.    ROS:  10 point ROS neg other than the symptoms noted above in the HPI.    Patient Active Problem List   Diagnosis     Eczema, behind left ear       PAST MEDICAL HISTORY: History reviewed. No pertinent past medical history.     PAST SURGICAL HISTORY:   Past Surgical History:   Procedure Laterality Date     APPENDECTOMY  1992     COLONOSCOPY N/A 6/20/2016    Procedure: COLONOSCOPY;  Surgeon: Danny Caballero MD;  [facility]     EXCISE MASS BACK N/A 9/28/2016    Procedure: EXCISE MASS BACK;  Surgeon: Sky Cordova MD;  [facility]     HEMORRHOIDECTOMY EXTERNAL N/A 9/15/2017    Procedure: HEMORRHOIDECTOMY EXTERNAL;  excision of hemorrhoid skin tag;  Surgeon: Sky Cordova MD;  [facility]        MEDICATIONS:   Current Outpatient Prescriptions:      Capsicum, Cayenne, (CAYENNE PEPPER PO), , Disp: , Rfl:      multivitamin, therapeutic with minerals (MULTI-VITAMIN) TABS, Take 1 tablet by mouth daily, Disp: , Rfl:      Probiotic Product (PRO-BIOTIC BLEND PO), , Disp: , Rfl:      triamcinolone (KENALOG) 0.1 % ointment, Apply sparingly to affected area twice daily as needed., Disp: 15 g, Rfl: 1     ALLERGIES:    Allergies   Allergen Reactions     No Known Drug Allergies         SOCIAL HISTORY:   Social History     Social History     Marital status:      Spouse name: N/A     Number of children: N/A     Years of education: N/A     Occupational History           works for  "[employer] from home     Social History Main Topics     Smoking status: Never Smoker     Smokeless tobacco: Never Used     Alcohol use 0.0 oz/week     0 Standard drinks or equivalent per week      Comment: 6/month     Drug use: No     Sexual activity: Yes     Partners: Male     Birth control/ protection: Surgical      Comment:  had vasectomy     Other Topics Concern     Not on file     Social History Narrative        FAMILY HISTORY:   Family History   Problem Relation Age of Onset     Colon Cancer Father      70s     Breast Cancer No family hx of      Coronary Artery Disease No family hx of      Hyperlipidemia No family hx of      Diabetes No family hx of         EXAM:Vitals: /70 (BP Location: Left arm, Cuff Size: Adult Regular)  Ht 5' 5\" (1.651 m)  Wt 170 lb (77.1 kg)  BMI 28.29 kg/m2  BMI= Body mass index is 28.29 kg/(m^2).    General appearance: Patient is alert and fully cooperative with history & exam.  No sign of distress is noted during the visit.     Psychiatric: Affect is pleasant & appropriate.  Patient appears motivated to improve health.     Respiratory: Breathing is regular & unlabored while sitting.     HEENT: Hearing is intact to spoken word.  Speech is clear.  No gross evidence of visual impairment that would impact ambulation.     Vascular: DP & PT pulses are intact & regular bilaterally.  No significant edema or varicosities noted.  CFT and skin temperature is normal to both lower extremities.     Neurologic: Lower extremity sensation is intact to light touch.  No evidence of weakness or contracture in the lower extremities.  No evidence of neuropathy.    Dermatologic: Skin is intact to both lower extremities with adequate texture, turgor and tone about the integument.  Right first and second toenails are quite dystrophic.  50% of the right hallux nail has been previously avulsed and a new nail growing in.  No discoloration of the toenail.  The right second toenail is quite " elongated forming a pincer nail.  There is subungual debris about the distal unattached portion.    Musculoskeletal: Patient is ambulatory without assistive device or brace.  Mild hallux valgus and hammertoe deformity however this is all flexible and reducible at this time.  No joint symptoms noted.     ASSESSMENT:       ICD-10-CM    1. Onychodystrophy L60.3    2. Hammer toes of both feet M20.41     M20.42    3. Hallux valgus of right foot M20.11         PLAN:  Reviewed patient's chart in [facility].      8/6/2018   Discussed etiology and treatment options.  This nail has been this shape for more than a decade without a significant change.  With the use of an antifungal this would reduce the fungal load but would not reduce the shape of the nail or improve the attachment or positioning or alignment of the nail.  We discussed appropriate hygiene management debridement and recommended abrasive debridement as well.  Written instructions dispensed.  We also discussed utilizing oral antifungals and what to expect over time as well as potential risks.  She would rather not utilize this medication.  We also discussed matrixectomy of the entire nail and what to expect in her lifetime after that.  All questions were answered and she will follow-up as needed.    Abe Chow DPM

## 2019-05-24 ENCOUNTER — OFFICE VISIT (OUTPATIENT)
Dept: FAMILY MEDICINE | Facility: OTHER | Age: 54
End: 2019-05-24
Payer: COMMERCIAL

## 2019-05-24 VITALS
BODY MASS INDEX: 28.19 KG/M2 | TEMPERATURE: 98.3 F | SYSTOLIC BLOOD PRESSURE: 128 MMHG | WEIGHT: 169.4 LBS | HEART RATE: 60 BPM | RESPIRATION RATE: 16 BRPM | OXYGEN SATURATION: 100 % | DIASTOLIC BLOOD PRESSURE: 70 MMHG

## 2019-05-24 DIAGNOSIS — J01.90 ACUTE SINUSITIS WITH SYMPTOMS > 10 DAYS: Primary | ICD-10-CM

## 2019-05-24 PROCEDURE — 99213 OFFICE O/P EST LOW 20 MIN: CPT | Performed by: NURSE PRACTITIONER

## 2019-05-24 ASSESSMENT — PAIN SCALES - GENERAL: PAINLEVEL: NO PAIN (0)

## 2019-05-24 NOTE — PROGRESS NOTES
"Subjective     Merari Cowart is a 53 year old female who presents to clinic today for the following health issues:    HPI   Acute Illness   Acute illness concerns: Sinus pressure, congestion, small cough  Onset: 3 weeks    Fever: no    Chills/Sweats: no    Headache (location?): YES    Sinus Pressure:YES    Conjunctivitis:  no    Ear Pain: no    Rhinorrhea: no     Congestion: YES    Sore Throat: no      Cough: YES- sometimes    Wheeze: no     Decreased Appetite: no     Nausea: no     Vomiting: no     Diarrhea:  no     Dysuria/Freq.: no     Fatigue/Achiness: YES    Sick/Strep Exposure: no      Therapies Tried and outcome: tylenol, nyquil    She is wondering if it is a sinus infection.  Been going on for three weeks.   Seems to have gotten better then worse.  Worst symptom is headache.      No sneezing or itchy watery eyes.  Cough is dry and productive.  Little bit of phlegm- swallows.  Some drainage some \"stuck\".  Frontal pressure.  No ear or dental pain.    Has not had seasonal allergies in past.      Reviewed and updated as needed this visit by Provider         Review of Systems   ROS COMP: Constitutional, HEENT, cardiovascular, pulmonary, gi and gu systems are negative, except as otherwise noted.      Objective    /70   Pulse 60   Temp 98.3  F (36.8  C) (Temporal)   Resp 16   Wt 76.8 kg (169 lb 6.4 oz)   SpO2 100%   BMI 28.19 kg/m    Body mass index is 28.19 kg/m .  Physical Exam   GENERAL: healthy, alert and no distress  EYES: Eyes grossly normal to inspection, PERRL and conjunctivae and sclerae normal  HENT: normal cephalic/atraumatic, ear canals and TM's normal, nose and mouth without ulcers or lesions, nasal mucosa edematous , rhinorrhea clear, oropharynx clear and oral mucous membranes moist  NECK: no adenopathy, no asymmetry, masses, or scars and thyroid normal to palpation  RESP: lungs clear to auscultation - no rales, rhonchi or wheezes  CV: regular rate and rhythm, normal S1 S2, no S3 or " S4, no murmur, click or rub, no peripheral edema and peripheral pulses strong  ABDOMEN: soft, nontender, no hepatosplenomegaly, no masses and bowel sounds normal  MS: no gross musculoskeletal defects noted, no edema    Diagnostic Test Results:  none         Assessment & Plan     1. Acute sinusitis with symptoms > 10 days  Treat with augmentin and supportive cares.  Follow up in clinic if no improvement, worsening symptoms, or concerns.   - amoxicillin-clavulanate (AUGMENTIN) 875-125 MG tablet; Take 1 tablet by mouth 2 times daily for 10 days  Dispense: 20 tablet; Refill: 0       Return in about 1 month (around 6/21/2019) for Routine Visit.    Vivien Estrada NP  [facility]

## 2019-05-24 NOTE — PROGRESS NOTES
"Subjective     Merari Cowart is a 53 year old female who presents to clinic today for the following health issues:    HPI   ALLERGIES     Onset: over 3 weeks    Description:   Nasal congestion: YES  Sneezing: no   Red, itchy eyes: no     Progression of Symptoms: gets better than gets worse    Accompanying Signs & Symptoms:  Cough: YES - off and on  Wheezing: no   Rash: no   Sinus/facial pain: YES   History:   Is it seasonal: in the fall and in the spring   History of Asthma: no   Has allergy testing been done: no     Precipitating factors:   Not enough sleep    Alleviating factors:  Tylenol for headaches, Nyquil       Therapies Tried and outcome: Tylenol, Nyquil    {additonal problems for provider to add (Optional):014452}    {HIST REVIEW/ LINKS 2 (Optional):283203}    Reviewed and updated as needed this visit by Provider         Review of Systems   {ROS COMP (Optional):142899}      Objective    There were no vitals taken for this visit.  There is no height or weight on file to calculate BMI.  Physical Exam   {Exam List (Optional):689953}    {Diagnostic Test Results (Optional):847411::\"Diagnostic Test Results:\",\"Labs reviewed in Epic\"}        {PROVIDER CHARTING PREFERENCE:533895}    "

## 2019-05-24 NOTE — PATIENT INSTRUCTIONS
For symptoms can try the following:   For body aches, headache, pain:   Tylenol (acetaminophen) up to 1000mg 3x/day.   Ibuprofen up to 800mg 3x/day     For sore throat  1. Throat lozenges  2. Cold beverages, ice pops, OR warm liquids (teas, etc)  3. Warm salt water gargles   4. Chloroseptic spray  5. Tylenol -or- ibuprofen for pain    For cough:  1. Sleep upright  2. Humidifier- warm showers  3. Honey  4.Vicks Vapor Rub  5. Mucinex tablets (guaifenesin)  6. Robitussin (guaifenesin) or Delsym (dextromethorphan) cough syrup as directed     For nasal congestion:  Can try neti pot or simply saline  Nasal spray containing oxymetazoline (Afrin) - 1 sprays each nostril twice a day for 3 days only (prolonged use can worsen congestion symptoms once discontinued)    Take the antibiotic for ten days  Eat a yogurt or take a probiotic for 10 days.    Should be re-evaluated for new fevers, shortness of breath or difficulty breathing, new or worsening symptoms.

## 2019-06-20 ENCOUNTER — HOSPITAL ENCOUNTER (OUTPATIENT)
Dept: MAMMOGRAPHY | Facility: CLINIC | Age: 54
Discharge: HOME | End: 2019-06-20
Attending: FAMILY MEDICINE | Admitting: FAMILY MEDICINE
Payer: COMMERCIAL

## 2019-06-20 DIAGNOSIS — Z12.31 VISIT FOR SCREENING MAMMOGRAM: ICD-10-CM

## 2019-06-20 PROCEDURE — 77067 SCR MAMMO BI INCL CAD: CPT

## 2019-06-21 ENCOUNTER — TELEPHONE (OUTPATIENT)
Dept: FAMILY MEDICINE | Facility: CLINIC | Age: 54
End: 2019-06-21

## 2019-06-21 DIAGNOSIS — R92.8 ABNORMAL MAMMOGRAM: Primary | ICD-10-CM

## 2019-06-21 NOTE — TELEPHONE ENCOUNTER
----- Message from Geronimo Greenberg MD sent at 6/20/2019  7:22 PM CDT -----  Please notify patient that she needs more mammogram views and ultrasound for completing her breast cancer screening test.    Geronimo Greenberg MD

## 2019-06-21 NOTE — RESULT ENCOUNTER NOTE
Please notify patient that she needs more mammogram views and ultrasound for completing her breast cancer screening test.    Geronimo Greenberg MD

## 2019-06-21 NOTE — TELEPHONE ENCOUNTER
Orders placed for follow up breast imaging as noted per radiologist's recommendations for further imaging of asymmetries of left breast noted on screening mammogram. Mandy Henao LPN

## 2019-06-28 ENCOUNTER — HOSPITAL ENCOUNTER (OUTPATIENT)
Dept: ULTRASOUND IMAGING | Facility: CLINIC | Age: 54
Discharge: HOME | End: 2019-06-28
Attending: FAMILY MEDICINE | Admitting: FAMILY MEDICINE
Payer: COMMERCIAL

## 2019-06-28 ENCOUNTER — HOSPITAL ENCOUNTER (OUTPATIENT)
Dept: MAMMOGRAPHY | Facility: CLINIC | Age: 54
End: 2019-06-28
Attending: FAMILY MEDICINE
Payer: COMMERCIAL

## 2019-06-28 DIAGNOSIS — R92.8 ABNORMAL MAMMOGRAM: ICD-10-CM

## 2019-06-28 PROCEDURE — G0279 TOMOSYNTHESIS, MAMMO: HCPCS

## 2019-06-28 PROCEDURE — 76642 ULTRASOUND BREAST LIMITED: CPT | Mod: LT

## 2019-07-12 ENCOUNTER — OFFICE VISIT (OUTPATIENT)
Dept: FAMILY MEDICINE | Facility: CLINIC | Age: 54
End: 2019-07-12
Payer: COMMERCIAL

## 2019-07-12 VITALS
DIASTOLIC BLOOD PRESSURE: 72 MMHG | WEIGHT: 173 LBS | HEIGHT: 65 IN | RESPIRATION RATE: 18 BRPM | SYSTOLIC BLOOD PRESSURE: 118 MMHG | BODY MASS INDEX: 28.82 KG/M2 | HEART RATE: 68 BPM | OXYGEN SATURATION: 99 % | TEMPERATURE: 98.3 F

## 2019-07-12 DIAGNOSIS — Z01.84 IMMUNITY STATUS TESTING: ICD-10-CM

## 2019-07-12 DIAGNOSIS — L30.9 ECZEMA, UNSPECIFIED TYPE: ICD-10-CM

## 2019-07-12 DIAGNOSIS — Z00.00 ROUTINE GENERAL MEDICAL EXAMINATION AT A HEALTH CARE FACILITY: Primary | ICD-10-CM

## 2019-07-12 DIAGNOSIS — Z00.8 ENCOUNTER FOR BIOMETRIC SCREENING: ICD-10-CM

## 2019-07-12 LAB
CHOLEST SERPL-MCNC: 236 MG/DL
GLUCOSE SERPL-MCNC: 91 MG/DL (ref 70–99)
HDLC SERPL-MCNC: 96 MG/DL
LDLC SERPL CALC-MCNC: 118 MG/DL
NONHDLC SERPL-MCNC: 140 MG/DL
TRIGL SERPL-MCNC: 109 MG/DL

## 2019-07-12 PROCEDURE — 36415 COLL VENOUS BLD VENIPUNCTURE: CPT | Performed by: FAMILY MEDICINE

## 2019-07-12 PROCEDURE — 82947 ASSAY GLUCOSE BLOOD QUANT: CPT | Performed by: FAMILY MEDICINE

## 2019-07-12 PROCEDURE — 80061 LIPID PANEL: CPT | Performed by: FAMILY MEDICINE

## 2019-07-12 PROCEDURE — 99396 PREV VISIT EST AGE 40-64: CPT | Performed by: FAMILY MEDICINE

## 2019-07-12 PROCEDURE — 86762 RUBELLA ANTIBODY: CPT | Performed by: FAMILY MEDICINE

## 2019-07-12 PROCEDURE — 86765 RUBEOLA ANTIBODY: CPT | Performed by: FAMILY MEDICINE

## 2019-07-12 PROCEDURE — 86735 MUMPS ANTIBODY: CPT | Performed by: FAMILY MEDICINE

## 2019-07-12 RX ORDER — TRIAMCINOLONE ACETONIDE 1 MG/G
OINTMENT TOPICAL
Qty: 15 G | Refills: 1
Start: 2019-07-12

## 2019-07-12 ASSESSMENT — ENCOUNTER SYMPTOMS
HEMATURIA: 0
MYALGIAS: 0
NERVOUS/ANXIOUS: 0
HEARTBURN: 0
COUGH: 0
DIZZINESS: 0
ARTHRALGIAS: 0
DIARRHEA: 0
PALPITATIONS: 0
SHORTNESS OF BREATH: 0
EYE PAIN: 0
JOINT SWELLING: 0
FREQUENCY: 0
ABDOMINAL PAIN: 0
DYSURIA: 0
SORE THROAT: 0
WEAKNESS: 0
HEADACHES: 0
CONSTIPATION: 0
CHILLS: 0
HEMATOCHEZIA: 0
BREAST MASS: 0
NAUSEA: 0
PARESTHESIAS: 0
FEVER: 0

## 2019-07-12 ASSESSMENT — PAIN SCALES - GENERAL: PAINLEVEL: NO PAIN (0)

## 2019-07-12 ASSESSMENT — MIFFLIN-ST. JEOR: SCORE: 1385.6

## 2019-07-12 NOTE — PROGRESS NOTES
SUBJECTIVE:   CC: Merari Cowart is an 53 year old woman who presents for preventive health visit.     Healthy Habits:     Getting at least 3 servings of Calcium per day:  Yes    Bi-annual eye exam:  Yes    Dental care twice a year:  Yes    Sleep apnea or symptoms of sleep apnea:  None    Diet:  Regular (no restrictions)    Frequency of exercise:  6-7 days/week    Duration of exercise:  30-45 minutes    Taking medications regularly:  Yes    Medication side effects:  Not applicable    PHQ-2 Total Score: 0    Additional concerns today:  Yes              Today's PHQ-2 Score:   PHQ-2 ( 1999 Pf[employer]) 7/12/2019   Q1: Little interest or pleasure in doing things 0   Q2: Feeling down, depressed or hopeless 0   PHQ-2 Score 0   Q1: Little interest or pleasure in doing things Not at all   Q2: Feeling down, depressed or hopeless Not at all   PHQ-2 Score 0       Abuse: Current or Past(Physical, Sexual or Emotional)- No  Do you feel safe in your environment? Yes    Social History     Tobacco Use     Smoking status: Never Smoker     Smokeless tobacco: Never Used   Substance Use Topics     Alcohol use: Yes     Alcohol/week: 0.0 oz     Comment: 6/month         Alcohol Use 7/12/2019   Prescreen: >3 drinks/day or >7 drinks/week? No   Prescreen: >3 drinks/day or >7 drinks/week? -       Reviewed orders with patient.  Reviewed health maintenance and updated orders accordingly - Yes  Lab work is in process    Mammogram Screening: Patient over age 50, mutual decision to screen reflected in health maintenance.    Pertinent mammograms are reviewed under the imaging tab.  History of abnormal Pap smear: NO - age 30-65 PAP every 5 years with negative HPV co-testing recommended  PAP / HPV Latest Ref Rng & Units 5/24/2016   PAP - NIL   HPV 16 DNA NEG Negative   HPV 18 DNA NEG Negative   OTHER HR HPV NEG Negative     Reviewed and updated as needed this visit by clinical staff  Tobacco  Allergies  Meds  Soc Hx        Reviewed and updated as  "needed this visit by Provider            Review of Systems   Constitutional: Negative for chills and fever.   HENT: Negative for congestion, ear pain, hearing loss and sore throat.    Eyes: Negative for pain and visual disturbance.   Respiratory: Negative for cough and shortness of breath.    Cardiovascular: Negative for chest pain, palpitations and peripheral edema.   Gastrointestinal: Negative for abdominal pain, constipation, diarrhea, heartburn, hematochezia and nausea.   Breasts:  Negative for tenderness, breast mass and discharge.   Genitourinary: Negative for dysuria, frequency, genital sores, hematuria, pelvic pain, urgency, vaginal bleeding and vaginal discharge.   Musculoskeletal: Negative for arthralgias, joint swelling and myalgias.   Skin: Negative for rash.   Neurological: Negative for dizziness, weakness, headaches and paresthesias.   Psychiatric/Behavioral: Negative for mood changes. The patient is not nervous/anxious.           OBJECTIVE:   /72   Pulse 68   Temp 98.3  F (36.8  C) (Temporal)   Resp 18   Ht 1.651 m (5' 5\")   Wt 78.5 kg (173 lb)   LMP 06/28/2019   SpO2 99%   Breastfeeding? No   BMI 28.79 kg/m    Physical Exam   Constitutional: She is oriented to person, place, and time. Vital signs are normal. She appears well-developed and well-nourished. No distress.   HENT:   Right Ear: Hearing, tympanic membrane, external ear and ear canal normal.   Left Ear: Hearing, tympanic membrane, external ear and ear canal normal.   Nose: Nose normal.   Mouth/Throat: Uvula is midline, oropharynx is clear and moist and mucous membranes are normal. No oropharyngeal exudate or posterior oropharyngeal erythema.   Eyes: Pupils are equal, round, and reactive to light. Conjunctivae, EOM and lids are normal. Right eye exhibits no discharge. Left eye exhibits no discharge.   Neck: Normal range of motion. Neck supple. No tracheal deviation present. No thyromegaly present.   Cardiovascular: Normal rate, " "regular rhythm, S1 normal, S2 normal, normal heart sounds and normal pulses. Exam reveals no S3, no S4 and no friction rub.   No murmur heard.  Pulmonary/Chest: Effort normal and breath sounds normal. No respiratory distress. She has no wheezes. She has no rales.   Abdominal: Soft. Normal appearance and bowel sounds are normal. She exhibits no mass. There is no hepatosplenomegaly. There is no tenderness.   Musculoskeletal: Normal range of motion. She exhibits no edema.   Lymphadenopathy:     She has no cervical adenopathy.        Right: No supraclavicular adenopathy present.        Left: No supraclavicular adenopathy present.   Neurological: She is alert and oriented to person, place, and time. She has normal strength and normal reflexes. No cranial nerve deficit or sensory deficit. She exhibits normal muscle tone.   Skin: Skin is warm, dry and intact. Lesion (cherry hemangioma on right upper chest) noted. No rash noted.   Psychiatric: She has a normal mood and affect. Judgment and thought content normal. Cognition and memory are normal.             ASSESSMENT/PLAN:       ICD-10-CM    1. Routine general medical examination at a health care facility Z00.00    2. Encounter for biometric screening Z01.89 Lipid Profile     Glucose   3. Eczema, unspecified type L30.9 triamcinolone (KENALOG) 0.1 % external ointment   4. Immunity status testing Z01.84 Rubeola Antibody IgG     Mumps Immune Status, IgG     Rubella Antibody IgG Quantitative     Discussed MMR vaccine booster as she is unclear of 1 versus 2 dose status.  She opted to have titers drawn rather than get MMR today.  If negative for immunity, she will return to clinic for nurse visit for MMR.  We discussed hep A/B and shingrix vaccines as well.    COUNSELING:  Reviewed preventive health counseling, as reflected in patient instructions    Estimated body mass index is 28.79 kg/m  as calculated from the following:    Height as of this encounter: 1.651 m (5' 5\").    " Weight as of this encounter: 78.5 kg (173 lb).    Weight management plan: Discussed healthy diet and exercise guidelines     reports that she has never smoked. She has never used smokeless tobacco.      Counseling Resources:  ATP IV Guidelines  Pooled Cohorts Equation Calculator  Breast Cancer Risk Calculator  FRAX Risk Assessment  ICSI Preventive Guidelines  Dietary Guidelines for Americans, 2010  USDA's MyPlate  ASA Prophylaxis  Lung CA Screening    Geronimo Greenberg MD  [facility]

## 2019-07-13 LAB
MEV IGG SER QL IA: 5.7 AI (ref 0–0.8)
MUV IGG SER QL IA: <0.2 AI (ref 0–0.8)
RUBV IGG SERPL IA-ACNC: 49 IU/ML

## 2019-07-15 NOTE — RESULT ENCOUNTER NOTE
Merari,  Your results show that your mumps immunity is low and you should get an MMR booster to bring this up.  You can make a nurse visit for this.  Your cholesterol and glucose testing looks fine.  Please let me know if you have any questions.    Sincerely,  Dr. Greenberg

## 2019-10-01 ENCOUNTER — HEALTH MAINTENANCE LETTER (OUTPATIENT)
Age: 54
End: 2019-10-01

## 2020-07-14 ENCOUNTER — HOSPITAL ENCOUNTER (OUTPATIENT)
Dept: MAMMOGRAPHY | Facility: CLINIC | Age: 55
Discharge: HOME | End: 2020-07-14
Attending: FAMILY MEDICINE | Admitting: FAMILY MEDICINE
Payer: COMMERCIAL

## 2020-07-14 DIAGNOSIS — Z12.31 VISIT FOR SCREENING MAMMOGRAM: ICD-10-CM

## 2020-07-14 PROCEDURE — 77063 BREAST TOMOSYNTHESIS BI: CPT

## 2020-07-17 ENCOUNTER — TELEPHONE (OUTPATIENT)
Dept: FAMILY MEDICINE | Facility: CLINIC | Age: 55
End: 2020-07-17

## 2020-07-17 NOTE — TELEPHONE ENCOUNTER
Spoke with patient and informed of results below. Patient understood. She was transferred to imaging to schedule for Ultrasound.     Kirsty Pimentel MA

## 2020-07-17 NOTE — TELEPHONE ENCOUNTER
"----- Message from Geronimo Greenberg MD sent at 7/17/2020 11:54 AM CDT -----  Will have staff call patient with message regarding recent results:  \"Test results indicate you need ultrasound of the left breast to complete your breast cancer screening.\"  Please help patient get scheduled for this follow-up testing.  It looks like the order has already been placed.    Geronimo Greenberg MD  "

## 2020-07-17 NOTE — RESULT ENCOUNTER NOTE
"Will have staff call patient with message regarding recent results:  \"Test results indicate you need ultrasound of the left breast to complete your breast cancer screening.\"  Please help patient get scheduled for this follow-up testing.  It looks like the order has already been placed.    Geronimo Greenberg MD"

## 2020-07-21 ENCOUNTER — HOSPITAL ENCOUNTER (OUTPATIENT)
Dept: ULTRASOUND IMAGING | Facility: CLINIC | Age: 55
Discharge: HOME | End: 2020-07-21
Attending: FAMILY MEDICINE | Admitting: FAMILY MEDICINE
Payer: COMMERCIAL

## 2020-07-21 DIAGNOSIS — R92.8 ABNORMAL MAMMOGRAM: ICD-10-CM

## 2020-07-21 PROCEDURE — 76642 ULTRASOUND BREAST LIMITED: CPT | Mod: LT

## 2020-07-21 NOTE — LETTER
Merari Cowart  [address] [place] [address] [address] [place] [id number]      July 21, 2020  Date of Exam:     Dear Merari Cowart:    Thank you for your recent visit.  Breast Imaging Result: We are pleased to inform you that the results of your recent breast imaging show no evidence of malignancy (cancer).    If you are experiencing any breast problems such as a lump or localized pain we request that you discuss this with your health care provider if you haven t already done so, as additional testing may be necessary.    As you know, early detection of cancer is very important. Although mammography is the most accurate method for early detection, not all cancers are found through mammography. A thorough examination includes a combination of mammography, physical examination and breast self-examination. Currently the American College of Radiology and the Society of Breast Imaging recommend an annual mammogram for all women beginning at the age of 40.    A report of your breast imaging results was sent to: Geronimo Greenberg    Your breast imaging will become part of your medical file here at [facility] for at least 10 years. You are responsible for informing any new health care provider or breast imaging facility of the date and location of this examination.    We appreciate the opportunity to participate in your health care.    Sincerely,  Dr Dillon  Interpreting Radiologist  [facility]

## 2020-07-24 NOTE — RESULT ENCOUNTER NOTE
Merari,  Your results show benign (noncancerous) findings.  Please let me know if you have any questions.    Sincerely,  Dr. Greenberg

## 2021-01-15 ENCOUNTER — HEALTH MAINTENANCE LETTER (OUTPATIENT)
Age: 56
End: 2021-01-15

## 2021-07-27 NOTE — PROGRESS NOTES
Assessment & Plan     Vaginal itching  - Wet prep - lab collect; Future  - Wet prep - lab collect    Yeast infection of the vagina  - fluconazole (DIFLUCAN) 150 MG tablet; Take 1 tablet today and the next tablet in 3 days (Aug 1)    Wet prep with yeast present. Start Diflucan, 2 pill regimen.  Follow-up with PCP if symptoms worsen or fail to improve.    20 minutes spent on the date of the encounter doing chart review, patient visit and documentation       Return in about 1 week (around 8/5/2021) for Recheck with primary care provider if not improved.    JOSH Petit [facility] is a 56 year old who presents for the following health issues     HPI     Vaginal Symptoms  Onset/Duration: 1 MONTH  Description:  Vaginal Discharge: none   Itching (Pruritis): YES- off and on  Burning sensation:  YES- when using vagisil  Odor: no  Accompanying Signs & Symptoms:  Urinary symptoms: no  Abdominal pain: no  Fever: no  History:   Sexually active: YES  New Partner: no  Recent antibiotic use: no  Previous vaginitis issues: YES  Precipitating or alleviating factors: None  Therapies tried and outcome: David Gage presents to clinic today for evaluation of vaginal itching.  Symptoms began about 1 month ago.  She notes that she has had itching on and off but it has been more persistent recently.  She has not had any urinary symptoms.  No dysuria or frequency noted. She does note that she had one episode of vaginal itching several months ago and used over-the-counter Vagisil cream and symptoms resolved after couple days.  This time she is also been using the Vagisil cream but symptoms have not improved.  She does not have associated vaginal discharge.  She is sexually active with her .  No concerns for infidelity.    Review of Systems   ROS negative except as stated above.      Objective    /77 (BP Location: Right arm)   Pulse 93   Temp 97.3  F (36.3   C) (Temporal)   Resp 14   Wt 83.7 kg (184 lb 8 oz)   SpO2 97%   BMI 30.70 kg/m    Body mass index is 30.7 kg/m .  Physical Exam   GENERAL: healthy, alert and no distress  NECK: no adenopathy, no asymmetry, masses, or scars and thyroid normal to palpation  RESP: lungs clear to auscultation - no rales, rhonchi or wheezes  CV: regular rate and rhythm, normal S1 S2, no S3 or S4, no murmur, click or rub, no peripheral edema and peripheral pulses strong  ABDOMEN: soft, nontender, no hepatosplenomegaly, no masses and bowel sounds normal  MS: no gross musculoskeletal defects noted, no edema  BACK: no CVA tenderness, no paralumbar tenderness    Results for orders placed or performed in visit on 07/29/21   Wet prep - lab collect     Status: Abnormal    Specimen: Vagina; Swab   Result Value Ref Range    Trichomonas Absent Absent    Yeast Present (A) Absent    Clue Cells Absent Absent    WBCs/high power field 1+ (A) None

## 2021-07-29 ENCOUNTER — OFFICE VISIT (OUTPATIENT)
Dept: FAMILY MEDICINE | Facility: CLINIC | Age: 56
End: 2021-07-29
Payer: COMMERCIAL

## 2021-07-29 VITALS
RESPIRATION RATE: 14 BRPM | BODY MASS INDEX: 30.7 KG/M2 | WEIGHT: 184.5 LBS | DIASTOLIC BLOOD PRESSURE: 77 MMHG | SYSTOLIC BLOOD PRESSURE: 119 MMHG | HEART RATE: 93 BPM | OXYGEN SATURATION: 97 % | TEMPERATURE: 97.3 F

## 2021-07-29 DIAGNOSIS — B37.31 YEAST INFECTION OF THE VAGINA: ICD-10-CM

## 2021-07-29 DIAGNOSIS — N89.8 VAGINAL ITCHING: Primary | ICD-10-CM

## 2021-07-29 LAB
CLUE CELLS: ABNORMAL
TRICHOMONAS, WET PREP: ABNORMAL
WBC'S/HIGH POWER FIELD, WET PREP: ABNORMAL
YEAST, WET PREP: PRESENT

## 2021-07-29 PROCEDURE — 87210 SMEAR WET MOUNT SALINE/INK: CPT | Performed by: PHYSICIAN ASSISTANT

## 2021-07-29 PROCEDURE — 99213 OFFICE O/P EST LOW 20 MIN: CPT | Performed by: PHYSICIAN ASSISTANT

## 2021-07-29 RX ORDER — FLUCONAZOLE 150 MG/1
TABLET ORAL
Qty: 2 TABLET | Refills: 0 | Status: SHIPPED | OUTPATIENT
Start: 2021-07-29 | End: 2022-04-28

## 2021-07-29 NOTE — PATIENT INSTRUCTIONS
Patient Education     Vaginal Infection: Yeast (Candidiasis)  Yeast infection occurs when yeast in the vagina increase and attacks the vaginal tissues. Yeast is a type of fungus. These infections are often caused by a type of yeast called Candida albicans. Other species of yeast can also cause infections. Factors that may make infection more likely include recent antibiotic use, douching, or increased sex. Yeast infections are more common in women who have diabetes, or are obese or pregnant, or have a weak immune system.   Symptoms of yeast infection    Clumpy or thin, white discharge, which may look like cottage cheese    No odor or minimal odor    Severe vaginal itching or burning    Burning with urination    Swelling, redness of vulva    Pain during sex    Treating yeast infection  Yeast infection is treated with a vaginal antifungal cream. In some cases, antifungal pills are prescribed instead. During treatment:     Finish all of your medicine, even if your symptoms go away.    Apply the cream before going to bed. Lie flat after applying so that it doesn't drip out.    Don't douche or use tampons.    Don't rely on a diaphragm or condoms, since the cream may weaken them.    Avoid intercourse if advised by your healthcare provider.  Should I treat a yeast infection myself?  Discuss with your healthcare provider whether you should use over-the-counter medicines to treat a yeast infection. Self-treatment may depend on whether:     You've had a yeast infection in the past.    You're at risk for sexually transmitted infections (STIs).  Call your healthcare provider if symptoms don't go away or come back after treatment.   [employer] last reviewed this educational content on 4/1/2020 2000-2021 [employer]. All rights reserved. This information is not intended as a substitute for professional medical care. Always follow your healthcare professional's instructions.

## 2021-09-04 ENCOUNTER — HEALTH MAINTENANCE LETTER (OUTPATIENT)
Age: 56
End: 2021-09-04

## 2022-02-19 ENCOUNTER — HEALTH MAINTENANCE LETTER (OUTPATIENT)
Age: 57
End: 2022-02-19

## 2022-04-28 ENCOUNTER — OFFICE VISIT (OUTPATIENT)
Dept: FAMILY MEDICINE | Facility: CLINIC | Age: 57
End: 2022-04-28
Payer: COMMERCIAL

## 2022-04-28 VITALS
OXYGEN SATURATION: 98 % | TEMPERATURE: 98.9 F | RESPIRATION RATE: 18 BRPM | SYSTOLIC BLOOD PRESSURE: 124 MMHG | DIASTOLIC BLOOD PRESSURE: 62 MMHG | BODY MASS INDEX: 31.28 KG/M2 | WEIGHT: 188 LBS | HEART RATE: 94 BPM

## 2022-04-28 DIAGNOSIS — S89.91XA INJURY OF RIGHT KNEE, INITIAL ENCOUNTER: Primary | ICD-10-CM

## 2022-04-28 PROCEDURE — 99213 OFFICE O/P EST LOW 20 MIN: CPT | Performed by: STUDENT IN AN ORGANIZED HEALTH CARE EDUCATION/TRAINING PROGRAM

## 2022-04-28 ASSESSMENT — PAIN SCALES - GENERAL: PAINLEVEL: NO PAIN (0)

## 2022-04-28 NOTE — PROGRESS NOTES
Assessment & Plan     Injury of right knee, initial encounter  No significant swelling and ligaments appear intact.  No serious direct blow contributing to injury.  I do not think we need x-ray today.  We will plan for continue with NSAIDs and icing as needed.  I expect gradual improvement.  If things not improving she will let me know and we will consider further imaging.      Return if symptoms worsen or fail to improve.    Channing Berrios MD  [facility] ANGEL Gage is a 56 year old who presents for the following health issues     Knee Pain    History of Present Illness       Reason for visit:  Knee injury  Symptoms include:  Pain in knee  Symptom intensity:  Severe  Symptom progression:  Staying the same  Had these symptoms before:  No  What makes it worse:  Turning legShe exercises with enough effort to increase her heart rate 30 to 60 minutes per day.  She exercises with enough effort to increase her heart rate 6 days per week.   She is taking medications regularly.     Patient with recent twisting knee injury.  No significant injuries in the past that she knows of.    Review of Systems   Constitutional, HEENT, cardiovascular, pulmonary, gi and gu systems are negative, except as otherwise noted.      Objective    /62 (BP Location: Left arm, Patient Position: Sitting, Cuff Size: Adult Large)   Pulse 94   Temp 98.9  F (37.2  C) (Temporal)   Resp 18   Wt 85.3 kg (188 lb)   LMP 06/28/2019   SpO2 98%   BMI 31.28 kg/m    Body mass index is 31.28 kg/m .  Physical Exam   GENERAL: healthy, alert and no distress  EYES: Eyes grossly normal to inspection, PERRL and conjunctivae and sclerae normal  NECK: no adenopathy, no asymmetry, masses, or scars and thyroid normal to palpation  RESP: lungs clear to auscultation - no rales, rhonchi or wheezes  CV: No lower extremity edema  MS: no gross musculoskeletal defects noted, no edema, PCL ACL MCL LCL appear intact and  symmetric.  Minimal joint line tenderness medially.  Some mild pes anserine tenderness.  SKIN: no suspicious lesions or rashes  NEURO: Normal strength and tone, mentation intact and speech normal  PSYCH: mentation appears normal, affect normal/bright

## 2022-10-16 ENCOUNTER — HEALTH MAINTENANCE LETTER (OUTPATIENT)
Age: 57
End: 2022-10-16

## 2022-10-27 NOTE — RESULT ENCOUNTER NOTE
Merari,  Your results are normal.  Please let me know if you have any questions.    Sincerely,  Dr. Greenberg head/abdomen

## 2023-03-03 ENCOUNTER — E-VISIT (OUTPATIENT)
Dept: URGENT CARE | Facility: URGENT CARE | Age: 58
End: 2023-03-03
Payer: COMMERCIAL

## 2023-03-03 DIAGNOSIS — B37.31 CANDIDAL VULVOVAGINITIS: Primary | ICD-10-CM

## 2023-03-03 PROCEDURE — 99421 OL DIG E/M SVC 5-10 MIN: CPT | Performed by: NURSE PRACTITIONER

## 2023-03-03 RX ORDER — FLUCONAZOLE 150 MG/1
150 TABLET ORAL ONCE
Qty: 1 TABLET | Refills: 0 | Status: SHIPPED | OUTPATIENT
Start: 2023-03-03 | End: 2023-03-03

## 2023-03-03 NOTE — PATIENT INSTRUCTIONS
Thank you for choosing us for your care. I have placed an order for a prescription so that you can start treatment. View your full visit summary for details by clicking on the link below. Your pharmacist will able to address any questions you may have about the medication.     If you re not feeling better within 2-3 days, please schedule an appointment.  You can schedule an appointment right here [place], or call [phone]  If the visit is for the same symptoms as your eVisit, we ll refund the cost of your eVisit if seen within seven days.      Yeast Infection (Candida Vaginal Infection)    You have a Candida vaginal infection. This is also known as a yeast infection. It's most often caused by a type of yeast (fungus) called Candida. Candida are normally found in the vagina. But if they increase in number, this can lead to infection and cause symptoms.   Symptoms of a yeast infection can include:     Clumpy or thin, white discharge, which may look like cottage cheese    Itching or burning    Burning with urination  Certain factors can make a yeast infection more likely. These can include:     Taking certain medicines, such as antibiotics or birth control pills    Pregnancy    Diabetes    Weak immune system  A yeast infection is most often treated with antifungal medicine. This may be given as a vaginal cream or pills you take by mouth. Treatment may last for about 1 to 7 days. Women with severe or recurrent infections may need longer courses of treatment.   Home care    If you re prescribed medicine, be sure to use it as directed. Finish all of the medicine, even if your symptoms go away. Don t try to treat yourself using over-the-counter products without talking with your provider first. They will let you know if this is a good option for you.    Ask your provider what steps you can take to help reduce your risk of having a yeast infection in the future.    Follow-up care  Follow up with your healthcare  provider, or as directed.   When to seek medical advice  Call your healthcare provider right away if:     You have a fever of 100.4 F (38 C) or higher, or as directed by your provider.    Your symptoms worsen, or they don t go away within a few days of starting treatment.    You have new pain in the lower belly or pelvic region.    You have side effects that bother you or a reaction to the cream or pills you re prescribed.    You or any partners you have sex with have new symptoms, such as a rash, joint pain, or sores.  [employer] last reviewed this educational content on 7/1/2020 2000-2021 [employer]. All rights reserved. This information is not intended as a substitute for professional medical care. Always follow your healthcare professional's instructions.

## 2023-03-26 ENCOUNTER — HEALTH MAINTENANCE LETTER (OUTPATIENT)
Age: 58
End: 2023-03-26

## 2024-06-01 ENCOUNTER — HEALTH MAINTENANCE LETTER (OUTPATIENT)
Age: 59
End: 2024-06-01

## 2024-10-19 ENCOUNTER — HEALTH MAINTENANCE LETTER (OUTPATIENT)
Age: 59
End: 2024-10-19

## 2025-06-14 ENCOUNTER — HEALTH MAINTENANCE LETTER (OUTPATIENT)
Age: 60
End: 2025-06-14

## 2025-07-25 PROBLEM — E66.811 CLASS 1 OBESITY DUE TO EXCESS CALORIES WITHOUT SERIOUS COMORBIDITY WITH BODY MASS INDEX (BMI) OF 31.0 TO 31.9 IN ADULT: Status: ACTIVE | Noted: 2025-07-25

## 2025-07-25 PROBLEM — E66.09 CLASS 1 OBESITY DUE TO EXCESS CALORIES WITHOUT SERIOUS COMORBIDITY WITH BODY MASS INDEX (BMI) OF 31.0 TO 31.9 IN ADULT: Status: ACTIVE | Noted: 2025-07-25

## 2025-07-25 PROBLEM — E78.00 PURE HYPERCHOLESTEROLEMIA: Chronic | Status: ACTIVE | Noted: 2025-07-25

## 2025-07-28 ENCOUNTER — PATIENT OUTREACH (OUTPATIENT)
Dept: CARE COORDINATION | Facility: CLINIC | Age: 60
End: 2025-07-28
Payer: COMMERCIAL

## 2025-07-28 ENCOUNTER — TELEPHONE (OUTPATIENT)
Dept: GASTROENTEROLOGY | Facility: CLINIC | Age: 60
End: 2025-07-28
Payer: COMMERCIAL

## 2025-07-28 NOTE — TELEPHONE ENCOUNTER
"Endoscopy Scheduling Screen    Caller: patient    Have you had any respiratory illness or flu-like symptoms in the last 10 days?  No    Patient is ACTIVE on [employer].  Inform patient that all appointment instructions will be sent via [employer].    Review patient's insurance for any non participating payor.    Ordering/Referring Provider:     [facility]      (If ordering provider performs procedure, schedule with ordering provider unless otherwise instructed. )    BMI: Estimated body mass index is 31.25 kg/m  as calculated from the following:    Height as of 7/25/25: 1.651 m (5' 5\").    Weight as of 7/25/25: 85.2 kg (187 lb 12.8 oz).     Sedation Ordered  moderate sedation.   If patient BMI > 50 do not schedule in [facility].    [facility] 45 [facility]    Are you taking methadone or Suboxone?  NO, No RN review required.    Have you been diagnosed and are being treated for severe PTSD or severe anxiety?  NO, No RN review required.    Are you taking any prescription medications for pain 3 or more times per week?   NO, No RN review required.    Do you have a history of malignant hyperthermia?  No    (Females) Are you currently pregnant?   No     Have you been diagnosed or told you have pulmonary hypertension?   No    Do you have an LVAD?  No    Have you been told you have moderate to severe sleep apnea?  No.    Have you been told you have COPD, asthma, or any other lung disease?  No    Has your doctor ordered any cardiac tests like echo, angiogram, stress test, ablation, or EKG, that you have not completed yet?  No    Do you  have a history of any heart conditions?  No     Have you ever had or are you waiting for an organ transplant?  No. Continue scheduling, no site restrictions.    Have you had a stroke or transient ischemic attack (TIA aka \"mini stroke\") in the last 2 years?   No.    Have you been diagnosed with or been told you have cirrhosis of the liver?   No.    Are you currently on " "dialysis?   No    Do you need assistance transferring?   No    BMI: Estimated body mass index is 31.25 kg/m  as calculated from the following:    Height as of 7/25/25: 1.651 m (5' 5\").    Weight as of 7/25/25: 85.2 kg (187 lb 12.8 oz).     Is patients BMI > 40 and scheduling location UP?  No    Do you take an injectable or oral medication for weight loss or diabetes (excluding insulin)?  No    Do you take the medication Naltrexone?  No    Do you take blood thinners?  No       Prep   Are you currently on dialysis or do you have chronic kidney disease?  No    Do you have a diagnosis of diabetes?  No    Do you have a diagnosis of cystic fibrosis (CF)?  No    On a regular basis do you go 3 -5 days between bowel movements?  No    BMI > 40?  No    Preferred Pharmacy:    [facility] [address] [place], [place] - [address]  [address]  [facility] [address]  Phone: [phone] Fax: [phone]      Final Scheduling Details     Procedure scheduled  Colonoscopy    Surgeon:  Priya      Date of procedure:  09/03/2025      Pre-OP / PAC:   No - Not required for this site.    Location  [facility] - Patient preference.    Sedation   MAC/Deep Sedation per site       Patient Reminders:   You will receive a call from a Nurse to review instructions and health history.  This assessment must be completed prior to your procedure.  Failure to complete the Nurse assessment may result in the procedure being cancelled.      On the day of your procedure, please designate an adult(s) who can drive you home stay with you for the next 24 hours. The medicines used in the exam will make you sleepy. You will not be able to drive.      You cannot take public transportation, ride share services, or non-medical taxi service without a responsible caregiver.  Medical transport services are allowed with the requirement that a responsible caregiver will receive you at your destination.  We require that drivers and caregivers are confirmed prior to your " procedure.

## 2025-08-01 ENCOUNTER — HOSPITAL ENCOUNTER (OUTPATIENT)
Dept: MAMMOGRAPHY | Facility: CLINIC | Age: 60
End: 2025-08-01
Attending: PEDIATRICS
Payer: COMMERCIAL

## 2025-08-01 DIAGNOSIS — Z12.31 VISIT FOR SCREENING MAMMOGRAM: ICD-10-CM

## 2025-08-01 PROCEDURE — 77063 BREAST TOMOSYNTHESIS BI: CPT

## 2025-08-12 ENCOUNTER — TELEPHONE (OUTPATIENT)
Dept: GASTROENTEROLOGY | Facility: CLINIC | Age: 60
End: 2025-08-12
Payer: COMMERCIAL

## 2025-09-02 ENCOUNTER — ANESTHESIA EVENT (OUTPATIENT)
Dept: GASTROENTEROLOGY | Facility: CLINIC | Age: 60
End: 2025-09-02
Payer: COMMERCIAL

## 2025-09-03 ENCOUNTER — ANESTHESIA (OUTPATIENT)
Dept: GASTROENTEROLOGY | Facility: CLINIC | Age: 60
End: 2025-09-03
Payer: COMMERCIAL

## 2025-09-03 ENCOUNTER — HOSPITAL ENCOUNTER (OUTPATIENT)
Facility: CLINIC | Age: 60
Discharge: HOME | End: 2025-09-03
Attending: INTERNAL MEDICINE | Admitting: INTERNAL MEDICINE
Payer: COMMERCIAL

## 2025-09-03 VITALS
OXYGEN SATURATION: 96 % | TEMPERATURE: 97.9 F | SYSTOLIC BLOOD PRESSURE: 144 MMHG | HEART RATE: 85 BPM | DIASTOLIC BLOOD PRESSURE: 95 MMHG | RESPIRATION RATE: 18 BRPM

## 2025-09-03 LAB — COLONOSCOPY: NORMAL

## 2025-09-03 PROCEDURE — 370N000017 HC ANESTHESIA TECHNICAL FEE, PER MIN: Performed by: INTERNAL MEDICINE

## 2025-09-03 PROCEDURE — 258N000003 HC RX IP 258 OP 636: Performed by: NURSE ANESTHETIST, CERTIFIED REGISTERED

## 2025-09-03 PROCEDURE — 45380 COLONOSCOPY AND BIOPSY: CPT | Performed by: INTERNAL MEDICINE

## 2025-09-03 PROCEDURE — 250N000011 HC RX IP 250 OP 636

## 2025-09-03 PROCEDURE — 45385 COLONOSCOPY W/LESION REMOVAL: CPT | Performed by: INTERNAL MEDICINE

## 2025-09-03 RX ORDER — SODIUM CHLORIDE, SODIUM LACTATE, POTASSIUM CHLORIDE, CALCIUM CHLORIDE 600; 310; 30; 20 MG/100ML; MG/100ML; MG/100ML; MG/100ML
INJECTION, SOLUTION INTRAVENOUS CONTINUOUS
Status: DISCONTINUED | OUTPATIENT
Start: 2025-09-03 | End: 2025-09-03 | Stop reason: HOSPADM

## 2025-09-03 RX ORDER — PROPOFOL 10 MG/ML
INJECTION, EMULSION INTRAVENOUS PRN
Status: DISCONTINUED | OUTPATIENT
Start: 2025-09-03 | End: 2025-09-03

## 2025-09-03 RX ORDER — PROPOFOL 10 MG/ML
INJECTION, EMULSION INTRAVENOUS CONTINUOUS PRN
Status: DISCONTINUED | OUTPATIENT
Start: 2025-09-03 | End: 2025-09-03

## 2025-09-03 RX ORDER — LIDOCAINE 40 MG/G
CREAM TOPICAL
Status: DISCONTINUED | OUTPATIENT
Start: 2025-09-03 | End: 2025-09-03 | Stop reason: HOSPADM

## 2025-09-03 RX ADMIN — SODIUM CHLORIDE, SODIUM LACTATE, POTASSIUM CHLORIDE, AND CALCIUM CHLORIDE: .6; .31; .03; .02 INJECTION, SOLUTION INTRAVENOUS at 10:23

## 2025-09-03 RX ADMIN — PROPOFOL 80 MG: 10 INJECTION, EMULSION INTRAVENOUS at 11:18

## 2025-09-03 RX ADMIN — PROPOFOL 150 MCG/KG/MIN: 10 INJECTION, EMULSION INTRAVENOUS at 11:18

## 2025-09-03 ASSESSMENT — ACTIVITIES OF DAILY LIVING (ADL)
ADLS_ACUITY_SCORE: 41

## (undated) DEVICE — TAPE ELASTIKON 3" LATEX 005175

## (undated) DEVICE — SNARE CAPIVATOR ROUND COLD SNR BX10 M00561101

## (undated) DEVICE — PREP POVIDONE IODINE SOLUTION 10% 120ML

## (undated) DEVICE — SOL ADH LIQUID BENZOIN SWAB 0.6ML C1544

## (undated) DEVICE — ESU GROUND PAD UNIVERSAL W/O CORD

## (undated) DEVICE — TAPE MEDIPORE 3"

## (undated) DEVICE — TUBING SUCTION 6"X3/16" N56A

## (undated) DEVICE — ENDO TRAP POLYP E-TRAP 00711099

## (undated) DEVICE — KIT ENDO TURNOVER/PROCEDURE CARRY-ON 101822

## (undated) DEVICE — GLOVE PROTEXIS W/NEU-THERA 7.5  2D73TE75

## (undated) DEVICE — SUCTION MANIFOLD NEPTUNE 2 SYS 1 PORT 702-025-000

## (undated) DEVICE — PREP SKIN SCRUB TRAY 4461A

## (undated) DEVICE — DRSG KERLIX FLUFFS X5

## (undated) DEVICE — SOLUTION WATER 1000ML BOTTLE R5000-01

## (undated) DEVICE — PACK MINOR PROCEDURE CUSTOM

## (undated) DEVICE — DRAPE LAP W/ARMBOARD 29410

## (undated) RX ORDER — LIDOCAINE HYDROCHLORIDE 20 MG/ML
INJECTION, SOLUTION EPIDURAL; INFILTRATION; INTRACAUDAL; PERINEURAL
Status: DISPENSED
Start: 2017-09-15

## (undated) RX ORDER — DEXAMETHASONE SODIUM PHOSPHATE 10 MG/ML
INJECTION INTRAMUSCULAR; INTRAVENOUS
Status: DISPENSED
Start: 2017-09-15

## (undated) RX ORDER — PROPOFOL 10 MG/ML
INJECTION, EMULSION INTRAVENOUS
Status: DISPENSED
Start: 2017-09-15

## (undated) RX ORDER — ONDANSETRON 2 MG/ML
INJECTION INTRAMUSCULAR; INTRAVENOUS
Status: DISPENSED
Start: 2017-09-15

## (undated) RX ORDER — GLYCOPYRROLATE 0.2 MG/ML
INJECTION INTRAMUSCULAR; INTRAVENOUS
Status: DISPENSED
Start: 2017-09-15

## (undated) RX ORDER — FENTANYL CITRATE 50 UG/ML
INJECTION, SOLUTION INTRAMUSCULAR; INTRAVENOUS
Status: DISPENSED
Start: 2017-09-15